# Patient Record
Sex: FEMALE | Race: WHITE | NOT HISPANIC OR LATINO | Employment: FULL TIME | URBAN - NONMETROPOLITAN AREA
[De-identification: names, ages, dates, MRNs, and addresses within clinical notes are randomized per-mention and may not be internally consistent; named-entity substitution may affect disease eponyms.]

---

## 2018-08-29 ENCOUNTER — OFFICE VISIT (OUTPATIENT)
Dept: NEUROLOGY | Facility: CLINIC | Age: 33
End: 2018-08-29

## 2018-08-29 VITALS
WEIGHT: 179.8 LBS | OXYGEN SATURATION: 98 % | DIASTOLIC BLOOD PRESSURE: 84 MMHG | BODY MASS INDEX: 29.96 KG/M2 | HEART RATE: 73 BPM | HEIGHT: 65 IN | SYSTOLIC BLOOD PRESSURE: 130 MMHG

## 2018-08-29 DIAGNOSIS — G43.119 INTRACTABLE MIGRAINE WITH AURA WITHOUT STATUS MIGRAINOSUS: Primary | ICD-10-CM

## 2018-08-29 PROCEDURE — 99203 OFFICE O/P NEW LOW 30 MIN: CPT | Performed by: NURSE PRACTITIONER

## 2018-08-29 RX ORDER — TOPIRAMATE 25 MG/1
25 TABLET ORAL SEE ADMIN INSTRUCTIONS
Qty: 120 TABLET | Refills: 3 | Status: SHIPPED | OUTPATIENT
Start: 2018-08-29 | End: 2018-09-28

## 2018-08-29 RX ORDER — LANOLIN ALCOHOL/MO/W.PET/CERES
325 CREAM (GRAM) TOPICAL 2 TIMES DAILY
Qty: 60 TABLET | Refills: 3 | Status: SHIPPED | OUTPATIENT
Start: 2018-08-29 | End: 2019-04-28

## 2018-08-29 RX ORDER — TOPIRAMATE 50 MG/1
50 TABLET, FILM COATED ORAL 2 TIMES DAILY
Qty: 60 TABLET | Refills: 2 | Status: SHIPPED | OUTPATIENT
Start: 2018-08-29 | End: 2018-08-29

## 2018-10-02 ENCOUNTER — PROCEDURE VISIT (OUTPATIENT)
Dept: NEUROLOGY | Facility: CLINIC | Age: 33
End: 2018-10-02

## 2018-10-02 VITALS
WEIGHT: 179 LBS | HEIGHT: 65 IN | OXYGEN SATURATION: 98 % | BODY MASS INDEX: 29.82 KG/M2 | HEART RATE: 84 BPM | SYSTOLIC BLOOD PRESSURE: 132 MMHG | DIASTOLIC BLOOD PRESSURE: 80 MMHG

## 2018-10-02 DIAGNOSIS — G43.119 INTRACTABLE MIGRAINE WITH AURA WITHOUT STATUS MIGRAINOSUS: Primary | ICD-10-CM

## 2018-10-02 PROCEDURE — 64615 CHEMODENERV MUSC MIGRAINE: CPT | Performed by: NURSE PRACTITIONER

## 2018-10-02 NOTE — PROGRESS NOTES
Patient has over 30 migraines a month over 6 hours duration interfering with the patient daily activities despite conservative medical treatment for acute and preventive measures.    The risk and benefit of the Botox treatment has been discussed with patient.  Safety information and contraindication of Botox has been reviewed with patient.  The most frequent Adverse reactions of Botox for migraine include but not limited to neck pain 9%, headache 5%, Ptosis 4%, muscle weakness 4%, injection site pain 3%, muscle spasms 2% have been gone over with our patient.  200 unit vial Botox was re-constituted with 4 mL 0.9 NS to 5 unit/0.1 mL using standard techniques.  10 units were given at the  muscle in 2 divided sites  5 units were given at the Procerus muscle  20 units were given at the Frontalis muscle in 4 divided sites  40 units were given at the Temporalis muscle in 8 divided sites  30 units were given at the Occipitalis muscle in 6 divided sites  20 units were given at the cervical paraspinal muscle in 4 divided sites  30 units were given at the Trapezius muscle in 6 divided sites  For a total of 155 units divided between 31 sites and 45 units of wastage  Patient tolerated procedure well without complication.

## 2018-11-20 ENCOUNTER — OFFICE VISIT (OUTPATIENT)
Dept: NEUROLOGY | Facility: CLINIC | Age: 33
End: 2018-11-20

## 2018-11-20 VITALS
DIASTOLIC BLOOD PRESSURE: 78 MMHG | SYSTOLIC BLOOD PRESSURE: 128 MMHG | HEIGHT: 65 IN | BODY MASS INDEX: 29.79 KG/M2 | OXYGEN SATURATION: 98 % | HEART RATE: 84 BPM

## 2018-11-20 DIAGNOSIS — G43.119 INTRACTABLE MIGRAINE WITH AURA WITHOUT STATUS MIGRAINOSUS: Primary | ICD-10-CM

## 2018-11-20 PROCEDURE — 99212 OFFICE O/P EST SF 10 MIN: CPT | Performed by: NURSE PRACTITIONER

## 2019-01-17 ENCOUNTER — PROCEDURE VISIT (OUTPATIENT)
Dept: NEUROLOGY | Facility: CLINIC | Age: 34
End: 2019-01-17

## 2019-01-17 VITALS — WEIGHT: 179 LBS | HEIGHT: 65 IN | HEART RATE: 67 BPM | OXYGEN SATURATION: 98 % | BODY MASS INDEX: 29.82 KG/M2

## 2019-01-17 DIAGNOSIS — G43.119 INTRACTABLE MIGRAINE WITH AURA WITHOUT STATUS MIGRAINOSUS: Primary | ICD-10-CM

## 2019-01-17 PROCEDURE — 64615 CHEMODENERV MUSC MIGRAINE: CPT | Performed by: NURSE PRACTITIONER

## 2019-01-17 NOTE — PROGRESS NOTES
Patient had over 28 migraines a month over 8 hours duration interfering with the patient daily activities despite conservative medical treatment for acute and preventive measures.  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  Patient states the migraines continue to come behind her eyes and into both temple areas they do occur.  The risk and benefit of the Botox treatment has been discussed with patient.  Safety information and contraindication of Botox has been reviewed with patient.  The most frequent Adverse reactions of Botox for migraine include but not limited to neck pain 9%, headache 5%, Ptosis 4%, muscle weakness 4%, injection site pain 3%, muscle spasms 2% have been gone over with our patient.  200 unit vial Botox was re-constituted with 4 mL 0.9 NS to 5 unit/0.1 mL using standard techniques.  10 units were given at the  muscle in 2 divided sites  5 units were given at the Procerus muscle  20 units were given at the Frontalis muscle in 4 divided sites  40 units were given at the Temporalis muscle in 8 divided sites  30 units were given at the Occipitalis muscle in 6 divided sites  20 units were given at the cervical paraspinal muscle in 4 divided sites  30 units were given at the Trapezius muscle in 6 divided sites    Follow the pain: patient continues with the above-noted migraines each month.  After further conversations we will add   20 units were given at the Orbicular oculi in 4 divided sites  5 Additional units were given in the procerus muscle  10 additional units were given in the frontalis muscle In 2 divided sites  10 additional units were given in the Temporalis muscle in 2 divided sites    Patient received a total of 200 units of Botox today in clinic to attempt to resolve the remaining for migraines.  We'll  have patient return to clinic in 3 months.      Patient tolerated procedure well without complication.

## 2019-04-28 ENCOUNTER — OFFICE VISIT (OUTPATIENT)
Dept: RETAIL CLINIC | Facility: CLINIC | Age: 34
End: 2019-04-28

## 2019-04-28 VITALS
OXYGEN SATURATION: 96 % | BODY MASS INDEX: 29.46 KG/M2 | RESPIRATION RATE: 20 BRPM | WEIGHT: 176.8 LBS | HEIGHT: 65 IN | TEMPERATURE: 99.1 F | HEART RATE: 95 BPM

## 2019-04-28 DIAGNOSIS — J03.90 TONSILLITIS: Primary | ICD-10-CM

## 2019-04-28 DIAGNOSIS — Z20.818 STREP THROAT EXPOSURE: ICD-10-CM

## 2019-04-28 LAB
EXPIRATION DATE: NORMAL
INTERNAL CONTROL: NORMAL
Lab: NORMAL
S PYO AG THROAT QL: NEGATIVE

## 2019-04-28 PROCEDURE — 87880 STREP A ASSAY W/OPTIC: CPT | Performed by: NURSE PRACTITIONER

## 2019-04-28 PROCEDURE — 99213 OFFICE O/P EST LOW 20 MIN: CPT | Performed by: NURSE PRACTITIONER

## 2019-04-28 RX ORDER — AMOXICILLIN AND CLAVULANATE POTASSIUM 875; 125 MG/1; MG/1
1 TABLET, FILM COATED ORAL 2 TIMES DAILY
Qty: 20 TABLET | Refills: 0 | Status: SHIPPED | OUTPATIENT
Start: 2019-04-28 | End: 2019-05-08

## 2019-04-28 RX ORDER — METHYLPREDNISOLONE 4 MG/1
TABLET ORAL
Qty: 21 TABLET | Refills: 0 | Status: SHIPPED | OUTPATIENT
Start: 2019-04-28 | End: 2019-05-23

## 2019-04-28 NOTE — PROGRESS NOTES
Subjective   No chief complaint on file.      Hermila Sierra is a 34 y.o. female.     Daughter recently had strep throat      Sore Throat    This is a new problem. The current episode started yesterday. The problem has been gradually worsening. Neither side of throat is experiencing more pain than the other. Maximum temperature: low grade. Associated symptoms include headaches. Pertinent negatives include no abdominal pain, congestion, coughing, diarrhea, ear pain, hoarse voice, shortness of breath, trouble swallowing or vomiting. She has had exposure to strep. Treatments tried: tylenol. The treatment provided no relief.        Allergies   Allergen Reactions   • Sulfa Antibiotics Unknown (See Comments)             Past Medical History:   Diagnosis Date   • Migraine        Past Surgical History:   Procedure Laterality Date   • TUBAL ABDOMINAL LIGATION     • WISDOM TOOTH EXTRACTION         Social History     Socioeconomic History   • Marital status:      Spouse name: Not on file   • Number of children: Not on file   • Years of education: Not on file   • Highest education level: Not on file   Tobacco Use   • Smoking status: Former Smoker     Years: 4.00     Last attempt to quit:      Years since quittin.3   • Smokeless tobacco: Never Used   Substance and Sexual Activity   • Alcohol use: No   • Drug use: No   • Sexual activity: Defer       Family History   Problem Relation Age of Onset   • Hypertension Father    • Heart disease Father    • Diabetes Father    • Diabetes Brother    • Diabetes Mother          Current Outpatient Medications:   •  amoxicillin-clavulanate (AUGMENTIN) 875-125 MG per tablet, Take 1 tablet by mouth 2 (Two) Times a Day for 10 days., Disp: 20 tablet, Rfl: 0  •  methylPREDNISolone (MEDROL, [place],) 4 MG tablet, Take as directed on package instructions., Disp: 21 tablet, Rfl: 0      Review of Systems   Constitutional: Negative for chills, diaphoresis, fatigue and fever.   HENT:  "Positive for sore throat. Negative for congestion, ear pain, hoarse voice, rhinorrhea, sinus pressure, sneezing and trouble swallowing.    Respiratory: Negative for cough and shortness of breath.    Gastrointestinal: Negative for abdominal pain, diarrhea and vomiting.   Musculoskeletal: Negative for myalgias.   Neurological: Positive for headache.        Vitals:    04/28/19 1459   Pulse: 95   Resp: 20   Temp: 99.1 °F (37.3 °C)   SpO2: 96%   Weight: 80.2 kg (176 lb 12.8 oz)   Height: 165.1 cm (65\")       Objective   Physical Exam   Constitutional: She is oriented to person, place, and time. She appears well-developed and well-nourished.   HENT:   Head: Normocephalic.   Right Ear: Tympanic membrane, external ear and ear canal normal.   Left Ear: Tympanic membrane, external ear and ear canal normal.   Nose: Nose normal.   Mouth/Throat: Mucous membranes are normal. Posterior oropharyngeal erythema present. Tonsils are 3+ on the right. Tonsils are 3+ on the left. Tonsillar exudate.   Eyes: Conjunctivae are normal.   Cardiovascular: Normal rate, regular rhythm, S1 normal, S2 normal and normal heart sounds.   Pulmonary/Chest: Effort normal and breath sounds normal.   Abdominal: Soft. Normal appearance. There is no tenderness.   Lymphadenopathy:     She has no cervical adenopathy.   Neurological: She is alert and oriented to person, place, and time.        Procedures     Assessment/Plan   Diagnoses and all orders for this visit:    Tonsillitis  -     amoxicillin-clavulanate (AUGMENTIN) 875-125 MG per tablet; Take 1 tablet by mouth 2 (Two) Times a Day for 10 days.  -     methylPREDNISolone (MEDROL, [place],) 4 MG tablet; Take as directed on package instructions.    Strep throat exposure  -     amoxicillin-clavulanate (AUGMENTIN) 875-125 MG per tablet; Take 1 tablet by mouth 2 (Two) Times a Day for 10 days.  -     POCT rapid strep A        Results for orders placed or performed in visit on 04/28/19   POCT rapid strep A   Result " Value Ref Range    Rapid Strep A Screen Negative Negative, VALID, INVALID, Not Performed    Internal Control Passed Passed    Lot Number [id number]     Expiration Date 10/20        PLAN: Discussed dosing, side effects, recommended other symptomatic care.  Patient should follow up with primary care provider if symptoms worsen, fail to resolve or other symptoms need attention. Patient/family agree to the above. Treatment based on physical exam and exposure. Advised to alternate tylenol and motrin for pain and/or fever, change toothbrush, stay hydrated and rest.       An After Visit Summary was printed and given to the patient.      JONELLE Tong

## 2019-05-14 PROBLEM — R51.9 NONINTRACTABLE EPISODIC HEADACHE: Status: ACTIVE | Noted: 2019-05-14

## 2019-05-23 ENCOUNTER — PROCEDURE VISIT (OUTPATIENT)
Dept: NEUROLOGY | Facility: CLINIC | Age: 34
End: 2019-05-23

## 2019-05-23 VITALS
HEIGHT: 65 IN | WEIGHT: 176 LBS | DIASTOLIC BLOOD PRESSURE: 60 MMHG | SYSTOLIC BLOOD PRESSURE: 104 MMHG | BODY MASS INDEX: 29.32 KG/M2 | HEART RATE: 62 BPM | OXYGEN SATURATION: 98 %

## 2019-05-23 DIAGNOSIS — G43.119 INTRACTABLE MIGRAINE WITH AURA WITHOUT STATUS MIGRAINOSUS: Primary | ICD-10-CM

## 2019-05-23 PROCEDURE — 64615 CHEMODENERV MUSC MIGRAINE: CPT | Performed by: NURSE PRACTITIONER

## 2019-05-23 NOTE — PROGRESS NOTES
.  SUBJECTIVE:  Hermila Sierra is a 34 y.o. female     Chief Complaint   Patient presents with   • Procedure     Pt sup. Botox  NDC:4475-6885-13       Hermila Sierra in clinic for Botox for migraines. Patient had 25 number of migraines monthly before Botox. Since  Botox patient has had a > 50% reduction of migraines.  Patient states she has had 2 severe migraines in the last 3 months with approximately 10 migraines total in the last 3 months Hermila Sierra  is pleased with current treatment.   The risk and benefit of the Botox treatment has been discussed with patient.  Safety information and contraindication of Botox has been reviewed with patient.  The most frequent Adverse reactions of Botox for migraine include but not limited to neck pain 9%, headache 5%, Ptosis 4%, muscle weakness 4%, injection site pain 3%, muscle spasms 2% have been gone over with our patient.    History of Present Illness    The following portions of the patient's history were reviewed and updated as appropriate: allergies, current medications, past family history, past medical history, past social history, past surgical history and problem list.    Review of Systems   Constitutional: Negative for chills and fever.   HENT: Negative for congestion, ear pain, hearing loss, rhinorrhea and sore throat.    Eyes: Negative for pain, discharge and redness.   Respiratory: Negative for cough, shortness of breath, wheezing and stridor.    Cardiovascular: Negative for chest pain, palpitations and leg swelling.   Gastrointestinal: Negative for abdominal pain, constipation, nausea and vomiting.   Endocrine: Negative for cold intolerance, heat intolerance and polyphagia.   Genitourinary: Negative for dysuria, flank pain, frequency and urgency.   Musculoskeletal: Negative for joint swelling, myalgias, neck pain and neck stiffness.   Skin: Negative for pallor, rash and wound.   Allergic/Immunologic: Negative for environmental allergies.   Neurological: Positive  "for headaches. Negative for dizziness, tremors, seizures, syncope, facial asymmetry, speech difficulty, weakness, light-headedness and numbness.   Hematological: Negative for adenopathy.   Psychiatric/Behavioral: Negative for confusion and hallucinations. The patient is not nervous/anxious.        OBJECTIVE:    Vitals:    05/23/19 1328   BP: 104/60   Pulse: 62   SpO2: 98%   Weight: 79.8 kg (176 lb)   Height: 165.1 cm (65\")     GENERAL: Patient is pleasant, cooperative, appears to be stated age.  Body habitus is endomorphic.  HEAD:  Head is normocephalic and atraumatic.    PSYCH:  Higher cortical function/mental status:  The patient is alert.  She  is oriented x3 to time, place and person.  Recent and the remote memory appear normal.  The patient has a good fund of knowledge.  There is no visual or auditory hallucination or suicidal or homicidal ideation.  SPEECH:There is no gross evidence of aphasia, dysarthria or agnosia.      CRANIAL NERVES: Extraocular movements are full and smooth with normal pursuits and saccades.  No nystagmus noted.  The face is symmetric. Tongue midline.   COORDINATION AND GAIT:  The patient walks with a narrow-based gait.        BOTOX PROCEDURE:   The risk and benefit of the Botox treatment has been discussed with patient.  Safety information and contraindication of Botox has been reviewed with patient.  The most frequent Adverse reactions of Botox for migraine include but not limited to neck pain 9%, headache 5%, Ptosis 4%, muscle weakness 4%, injection site pain 3%, muscle spasms 2% have been gone over with our patient.  200 unit vial Botox was re-constituted with 4 mL 0.9 NS to 5 unit/0.1 mL using standard techniques.  10 units were given at the  muscle in 2 divided sites  5 units were given at the Procerus muscle  20 units were given at the Frontalis muscle in 4 divided sites  40 units were given at the Temporalis muscle in 8 divided sites  30 units were given at the " Occipitalis muscle in 6 divided sites  20 units were given at the cervical paraspinal muscle in 4 divided sites  30 units were given at the Trapezius muscle in 6 divided sites  Follow the pain: Additional units  30 units (15 units bilat) were given at the Orbicularis oculi  5 units additional Frontalis  10 units additional at the Temporalis muscle  For a total of 200 units 0 units wastage.  Patient tolerated the procedure well  Patient tolerated procedure well without complication.      ASSESSMENT/PLAN:    It has been determined that Ms. Sierra has chronic migraine headaches. We will proceed with a 12 week regimen of 200 units of Botox injections, to treat the patient's chronic migraines.

## 2019-09-12 ENCOUNTER — PROCEDURE VISIT (OUTPATIENT)
Dept: NEUROLOGY | Facility: CLINIC | Age: 34
End: 2019-09-12

## 2019-09-12 VITALS
TEMPERATURE: 99 F | HEART RATE: 86 BPM | BODY MASS INDEX: 27.29 KG/M2 | SYSTOLIC BLOOD PRESSURE: 110 MMHG | OXYGEN SATURATION: 98 % | DIASTOLIC BLOOD PRESSURE: 78 MMHG | WEIGHT: 164 LBS

## 2019-09-12 DIAGNOSIS — G43.119 INTRACTABLE MIGRAINE WITH AURA WITHOUT STATUS MIGRAINOSUS: Primary | ICD-10-CM

## 2019-09-12 PROCEDURE — 64615 CHEMODENERV MUSC MIGRAINE: CPT | Performed by: NURSE PRACTITIONER

## 2019-10-17 ENCOUNTER — HOSPITAL ENCOUNTER (OUTPATIENT)
Dept: MRI IMAGING | Facility: HOSPITAL | Age: 34
Discharge: HOME | End: 2019-10-17
Admitting: NURSE PRACTITIONER

## 2019-10-17 DIAGNOSIS — G43.119 INTRACTABLE MIGRAINE WITH AURA WITHOUT STATUS MIGRAINOSUS: ICD-10-CM

## 2019-10-17 PROCEDURE — 70551 MRI BRAIN STEM W/O DYE: CPT

## 2019-10-24 ENCOUNTER — OFFICE VISIT (OUTPATIENT)
Dept: NEUROLOGY | Facility: CLINIC | Age: 34
End: 2019-10-24

## 2019-10-24 VITALS
HEART RATE: 66 BPM | SYSTOLIC BLOOD PRESSURE: 108 MMHG | OXYGEN SATURATION: 99 % | TEMPERATURE: 98.6 F | HEIGHT: 65 IN | WEIGHT: 171 LBS | RESPIRATION RATE: 17 BRPM | DIASTOLIC BLOOD PRESSURE: 70 MMHG | BODY MASS INDEX: 28.49 KG/M2

## 2019-10-24 DIAGNOSIS — G43.119 INTRACTABLE MIGRAINE WITH AURA WITHOUT STATUS MIGRAINOSUS: Primary | ICD-10-CM

## 2019-10-24 DIAGNOSIS — G44.219 EPISODIC TENSION-TYPE HEADACHE, NOT INTRACTABLE: ICD-10-CM

## 2019-10-24 PROCEDURE — 99213 OFFICE O/P EST LOW 20 MIN: CPT | Performed by: NURSE PRACTITIONER

## 2019-10-24 RX ORDER — TOPIRAMATE 100 MG/1
100 CAPSULE, EXTENDED RELEASE ORAL DAILY
Qty: 30 CAPSULE | Refills: 3 | Status: SHIPPED | OUTPATIENT
Start: 2019-10-24 | End: 2020-05-08

## 2020-01-08 ENCOUNTER — PROCEDURE VISIT (OUTPATIENT)
Dept: NEUROLOGY | Facility: CLINIC | Age: 35
End: 2020-01-08

## 2020-01-08 VITALS
TEMPERATURE: 97.9 F | OXYGEN SATURATION: 98 % | BODY MASS INDEX: 28.49 KG/M2 | HEIGHT: 65 IN | WEIGHT: 171 LBS | HEART RATE: 89 BPM

## 2020-01-08 DIAGNOSIS — G43.119 INTRACTABLE MIGRAINE WITH AURA WITHOUT STATUS MIGRAINOSUS: Primary | ICD-10-CM

## 2020-01-08 PROCEDURE — 64615 CHEMODENERV MUSC MIGRAINE: CPT | Performed by: NURSE PRACTITIONER

## 2020-01-08 NOTE — PROGRESS NOTES
.  SUBJECTIVE:  Hermila Sierar is a 34 y.o. female     Chief Complaint   Patient presents with   • Botulinum Toxin Injection       Hermila Sierra in clinic for Botox for migraines. Patient had 25 number of migraines monthly before Botox. Since  Botox patient has had a > 50% reduction of migraines. Hermila Sierra  is pleased with current treatment.   The risk and benefit of the Botox treatment has been discussed with patient.  Safety information and contraindication of Botox has been reviewed with patient.  The most frequent Adverse reactions of Botox for migraine include but not limited to neck pain 9%, headache 5%, Ptosis 4%, muscle weakness 4%, injection site pain 3%, muscle spasms 2% have been gone over with our patient.    History of Present Illness    The following portions of the patient's history were reviewed and updated as appropriate: allergies, current medications, past family history, past medical history, past social history, past surgical history and problem list.    Review of Systems   Constitutional: Negative for chills and fever.   HENT: Negative for congestion, ear pain, hearing loss, rhinorrhea and sore throat.    Eyes: Negative for pain, discharge and redness.   Respiratory: Negative for cough, shortness of breath, wheezing and stridor.    Cardiovascular: Negative for chest pain, palpitations and leg swelling.   Gastrointestinal: Negative for abdominal pain, constipation, nausea and vomiting.   Endocrine: Negative for cold intolerance, heat intolerance and polyphagia.   Genitourinary: Negative for dysuria, flank pain, frequency and urgency.   Musculoskeletal: Negative for joint swelling, myalgias, neck pain and neck stiffness.   Skin: Negative for pallor, rash and wound.   Allergic/Immunologic: Negative for environmental allergies.   Neurological: Positive for headaches. Negative for dizziness, tremors, seizures, syncope, facial asymmetry, speech difficulty, weakness, light-headedness and numbness.  "  Hematological: Negative for adenopathy.   Psychiatric/Behavioral: Negative for confusion and hallucinations. The patient is not nervous/anxious.        OBJECTIVE:    Vitals:    01/08/20 0915   Pulse: 89   Temp: 97.9 °F (36.6 °C)   SpO2: 98%   Weight: 77.6 kg (171 lb)   Height: 165.1 cm (65\")     GENERAL: Patient is pleasant, cooperative, appears to be stated age.  Body habitus is endomorphic.  HEAD:  Head is normocephalic and atraumatic.    PSYCH:  Higher cortical function/mental status:  The patient is alert.  She  is oriented x3 to time, place and person.  Recent and the remote memory appear normal.  The patient has a good fund of knowledge.  There is no visual or auditory hallucination or suicidal or homicidal ideation.  SPEECH:There is no gross evidence of aphasia, dysarthria or agnosia.      CRANIAL NERVES: Extraocular movements are full and smooth with normal pursuits and saccades.  No nystagmus noted.  The face is symmetric. Tongue midline.   COORDINATION AND GAIT:  The patient walks with a narrow-based gait.        BOTOX PROCEDURE:   The risk and benefit of the Botox treatment has been discussed with patient.  Safety information and contraindication of Botox has been reviewed with patient.  The most frequent Adverse reactions of Botox for migraine include but not limited to neck pain 9%, headache 5%, Ptosis 4%, muscle weakness 4%, injection site pain 3%, muscle spasms 2% have been gone over with our patient.  200 unit vial Botox was re-constituted with 4 mL 0.9 NS to 5 unit/0.1 mL using standard techniques.  10 units were given at the  muscle in 2 divided sites  5 units were given at the Procerus muscle  20 units were given at the Frontalis muscle in 4 divided sites  40 units were given at the Temporalis muscle in 8 divided sites  30 units were given at the Occipitalis muscle in 6 divided sites  20 units were given at the cervical paraspinal muscle in 4 divided sites  30 units were given at the " Trapezius muscle in 6 divided sites  Follow the pain: Additional units  30 units (15 units bilat) were given at the Orbicularis oculi  5 units additional Frontalis  10 units additional at the Temporalis muscle  For a total of 200 units 0 units wastage.  Patient tolerated the procedure well  Patient tolerated procedure well without complication.      ASSESSMENT/PLAN:    It has been determined that Ms. Sierra has chronic migraine headaches. We will proceed with a 12 week regimen of 200 units of Botox injections, to treat the patient's chronic migraines.

## 2020-03-24 ENCOUNTER — TELEPHONE (OUTPATIENT)
Dept: NEUROLOGY | Facility: CLINIC | Age: 35
End: 2020-03-24

## 2020-05-06 ENCOUNTER — TELEPHONE (OUTPATIENT)
Dept: NEUROLOGY | Facility: CLINIC | Age: 35
End: 2020-05-06

## 2020-05-06 NOTE — TELEPHONE ENCOUNTER
Pt is calling to schedule Botox Appt.   She is a previous valerie jordan appt.       Please call [phone]

## 2020-05-07 NOTE — TELEPHONE ENCOUNTER
Okay thank you. I printed the information I need other than the note and I will get started on this on Monday.

## 2020-05-08 ENCOUNTER — TELEMEDICINE (OUTPATIENT)
Dept: NEUROLOGY | Facility: CLINIC | Age: 35
End: 2020-05-08

## 2020-05-08 DIAGNOSIS — G43.009 MIGRAINE WITHOUT AURA AND WITHOUT STATUS MIGRAINOSUS, NOT INTRACTABLE: Primary | ICD-10-CM

## 2020-05-08 PROCEDURE — 99215 OFFICE O/P EST HI 40 MIN: CPT | Performed by: PSYCHIATRY & NEUROLOGY

## 2020-05-08 NOTE — PROGRESS NOTES
Subjective:    CC: Hermila Sierra is seen today via video visit in consultation at the request of her PCP for headaches    HPI:  35-year-old female with no significant past medical history presents with headaches.  As per patient she has had headaches since she was a teenager however they worsened while she was pregnant with her third child who is now 10.  In November 2017 patient had a severe thunderclap headache in the back of her head.  She had a CT head at the time with did not show any hemorrhage.  After that she started to have frequent headaches and was tried on several different medications including propranolol that made her blood pressure drop,  amitriptyline that made her dull, Topamax that caused dizziness, Trokendi that was not approved by insurance and Imitrex that made her very sleepy and caused burning in the back of her head.  In October 2018 she started to get Botox from [facility].  That helped with her headaches tremendously and she now has 1 migraine headache every few months for which she takes over-the-counter medications that may or may not help.  However she continues to have a few dull headaches each month around her menstrual cycle.  These started after she went off birth control.  Has had a dull headache for the past 3 days now which was not relieved by taking Tylenol or ibuprofen.  She also had a MRI brain in October 2019 that did not show any acute intracranial abnormalities or chronic ischemic changes.    The following portions of the patient's history were reviewed today and updated as of 05/08/2020  : allergies, current medications, past family history, past medical history, past social history, past surgical history and problem list  These document will be scanned to patient's chart.      Current Outpatient Medications:   •  Ubrogepant (ubrogepant) 100 MG tablet, Take 1 tablet by mouth As Needed (for headache). May repeat once in 2 hours.  Do not take over 2 tabs a day or 8 tabs a  month, Disp: 8 tablet, Rfl: 3   Past Medical History:   Diagnosis Date   • Migraine       Past Surgical History:   Procedure Laterality Date   • TUBAL ABDOMINAL LIGATION  2010   • WISDOM TOOTH EXTRACTION        Family History   Problem Relation Age of Onset   • Hypertension Father    • Heart disease Father    • Diabetes Father    • Diabetes Brother    • Diabetes Mother       Social History     Socioeconomic History   • Marital status:      Spouse name: Not on file   • Number of children: Not on file   • Years of education: Not on file   • Highest education level: Not on file   Tobacco Use   • Smoking status: Former Smoker     Years: 4.00     Last attempt to quit: 2010     Years since quitting: 10.3   • Smokeless tobacco: Never Used   Substance and Sexual Activity   • Alcohol use: No   • Drug use: No   • Sexual activity: Defer     Review of Systems    Objective:    There were no vitals taken for this visit.    Neurology Exam:    General apperance: NAD.     Mental status: Alert, awake and oriented to time place and person.    Recent and Remote memory: Intact.    Attention span and Concentration: Normal.     Language and Speech: Intact- No dysarthria.    Fluency, Naming , Repitition and Comprehension:  Intact    Cranial Nerves:   CN II: Visual fields are full. Intact. Fundi - Normal, No papillederma, Pupils - TOM  CN III, IV and VI: Extraocular movements are intact. Normal saccades.   CN V: Facial sensation is intact.   CN VII: Muscles of facial expression reveal no asymmetry. Intact.   CN VIII: Hearing is intact. Whispered voice intact.   CN IX and X: Palate elevates symmetrically. Intact  CN XI: Shoulder shrug is intact.   CN XII: Tongue is midline without evidence of atrophy or fasciculation.       Motor:  Normal    Gait: Normal.      Assessment and Plan:  1. Migraine without aura and without status migrainosus, not intractable  Patient has a combination of migraine and hormonal headaches  -I will restart  Botox.  Can schedule her for that next week as her last injection was in January.  -For abortive treatment of her headaches I will prescribe her Ubrelvy 100 mg as needed  -For her hormonal headaches I have told her to speak to her OB/GYN about starting a low-dose oral contraceptive pill       Follow-up in 1 week for Botox    I spent over 40 minutes with the patient face to face out of which over 50% (25 minutes) was spent in management, instructions and education regarding her headaches    Sravani Mccauley MD

## 2020-05-11 ENCOUNTER — TELEPHONE (OUTPATIENT)
Dept: NEUROLOGY | Facility: CLINIC | Age: 35
End: 2020-05-11

## 2020-05-15 ENCOUNTER — TELEPHONE (OUTPATIENT)
Dept: NEUROLOGY | Facility: CLINIC | Age: 35
End: 2020-05-15

## 2020-05-15 NOTE — TELEPHONE ENCOUNTER
PT CALLED AND STATED THAT PHARMACY HAS SENT INFORMATION RE: PRIOR AUTH NEEDED FOR UBRELVY AND IS WAITING FOR A RESPONSE.  SHE IS CALLING TO FOLLOW UP ON THIS PRIOR AUTH TO GET THAT FILLED.    CALL BACK TO UPDATE ON STATUS: [phone], AFTER 2:30 PM.  PLEASE LEAVE VM IF NO ANSWER.

## 2020-06-04 ENCOUNTER — TELEPHONE (OUTPATIENT)
Dept: NEUROLOGY | Facility: CLINIC | Age: 35
End: 2020-06-04

## 2020-06-04 NOTE — TELEPHONE ENCOUNTER
Dorinda Sheehan called to verify and confirm office address for pt Botox shipping due to arrive [employer] 6/9/20. Thanks.

## 2020-06-18 ENCOUNTER — PROCEDURE VISIT (OUTPATIENT)
Dept: OBSTETRICS AND GYNECOLOGY | Facility: CLINIC | Age: 35
End: 2020-06-18

## 2020-06-18 VITALS
BODY MASS INDEX: 29.16 KG/M2 | DIASTOLIC BLOOD PRESSURE: 70 MMHG | SYSTOLIC BLOOD PRESSURE: 110 MMHG | HEIGHT: 65 IN | WEIGHT: 175 LBS

## 2020-06-18 DIAGNOSIS — G43.829 MENSTRUAL MIGRAINE WITHOUT STATUS MIGRAINOSUS, NOT INTRACTABLE: ICD-10-CM

## 2020-06-18 DIAGNOSIS — Z01.419 ENCOUNTER FOR GYNECOLOGICAL EXAMINATION (GENERAL) (ROUTINE) WITHOUT ABNORMAL FINDINGS: Primary | ICD-10-CM

## 2020-06-18 DIAGNOSIS — Z12.39 ENCOUNTER FOR BREAST CANCER SCREENING OTHER THAN MAMMOGRAM: ICD-10-CM

## 2020-06-18 DIAGNOSIS — N92.0 MENORRHAGIA WITH REGULAR CYCLE: ICD-10-CM

## 2020-06-18 PROCEDURE — 99202 OFFICE O/P NEW SF 15 MIN: CPT | Performed by: OBSTETRICS & GYNECOLOGY

## 2020-06-18 PROCEDURE — 99385 PREV VISIT NEW AGE 18-39: CPT | Performed by: OBSTETRICS & GYNECOLOGY

## 2020-06-18 RX ORDER — LEVONORGESTREL AND ETHINYL ESTRADIOL 100-20(84)
1 KIT ORAL DAILY
Qty: 91 TABLET | Refills: 4 | Status: SHIPPED | OUTPATIENT
Start: 2020-06-18 | End: 2020-08-09

## 2020-06-18 NOTE — PROGRESS NOTES
Subjective  Chief Complaint   Patient presents with   • Gynecologic Exam     Patient is 35 y.o.  here as a new patient for her annual examination.  Patient is also here to discuss the possibility of Seasonale.  Patient has had a previous tubal ligation.  Patient has a history of migraines.  Patient has been seen by neurologist in [place].  Patient has been receiving Botox injections.  She reports 99% improvement in her migraines.  Patient reports however she continues to have migraines with her menses.  Patient reports her neurologist is recommended Seasonale.  Patient also reports having heavy menses.  Patient reports they are irregular in nature.  She reports they are lasting for 6 days.  Patient reports they are extremely heavy changing a maxi pad and tampon every 1 hour.  Patient also with a family history of breast cancer.  Patient has 8 genetic counseling and screening.  Patient reports her screening returned as negative.  Patient reports however based upon family history she is at high risk for breast cancer.  Patient has been having mammograms every 6 months.  Patient will also start having breast MRI.  Patient sees Dr. Pinzon for her primary care.  Patient has not had a Pap smear since 2018.  Patient does have a history of previous abnormal Pap smears in the past.    History  Past Medical History:   Diagnosis Date   • Abnormal Pap smear of cervix    • Migraine      Current Outpatient Medications on File Prior to Visit   Medication Sig Dispense Refill   • OnabotulinumtoxinA 200 units reconstituted solution Inject 200 units IM into head neck shoulders every 12 weeks per FDA protocol. Dx:G43.709 1 each 3   • Ubrogepant (ubrogepant) 100 MG tablet Take 1 tablet by mouth As Needed (for headache). May repeat once in 2 hours.  Do not take over 2 tabs a day or 8 tabs a month 8 tablet 3     No current facility-administered medications on file prior to visit.      Allergies   Allergen Reactions   • Sulfa  "Antibiotics Unknown (See Comments)           Past Surgical History:   Procedure Laterality Date   • DILATATION AND CURETTAGE     • TUBAL ABDOMINAL LIGATION  2010   • WISDOM TOOTH EXTRACTION       Family History   Problem Relation Age of Onset   • Hypertension Father    • Heart disease Father    • Diabetes Father    • Diabetes Brother    • Diabetes Mother    • Breast cancer Paternal Grandmother    • Breast cancer Paternal Aunt      Social History     Socioeconomic History   • Marital status:      Spouse name: Not on file   • Number of children: Not on file   • Years of education: Not on file   • Highest education level: Not on file   Tobacco Use   • Smoking status: Former Smoker     Years: 4.00     Last attempt to quit: 2010     Years since quitting: 10.4   • Smokeless tobacco: Never Used   Substance and Sexual Activity   • Alcohol use: No   • Drug use: No   • Sexual activity: Yes     Partners: Male     Birth control/protection: Surgical     Review of Systems  All systems were reviewed and negative except for:  Hematologic / Lymphatic: positive for  anemia  Behavioral/Psych: positive for  unstable mood     Objective  Vitals:    06/18/20 1529   BP: 110/70   Weight: 79.4 kg (175 lb)   Height: 165.1 cm (65\")     Physical Exam:  General Appearance: alert, appears stated age and cooperative  Head: normocephalic, without obvious abnormality and atraumatic  Eyes: lids and lashes normal, conjunctivae and sclerae normal, no icterus, no pallor, corneas clear and PERRLA  Ears: ears appear intact with no abnormalities noted  Nose: nares normal, septum midline, mucosa normal and no drainage  Neck: suppple, trachea midline and no thyromegaly  Lungs: clear to auscultation, respirations regular, respirations even and respirations unlabored  Heart: regular rhythm and normal rate, normal S1, S2, no murmur, gallop, or rubs and no click  Breasts: Examined in supine position  Symmetric without masses or skin dimpling  Nipples " normal without inversion, lesions or discharge  There are no palpable axillary nodes  Abdomen: normal bowel sounds, no masses, no hepatomegaly, no splenomegaly, soft non-tender, no guarding and no rebound tenderness  Pelvic: Clinical staff was present for exam  External genitalia:  normal appearance of the external genitalia including Bartholin's and [place]'s glands.  :  urethral meatus normal;  Vaginal:  normal pink mucosa without prolapse or lesions.  Cervix:  normal appearance.  Uterus:  normal size, shape and consistency.  Adnexa:  normal bimanual exam of the adnexa.  Pap smear done and specimen sent using Thin-Prep technique  Extremities: moves extremities well, no edema, no cyanosis and no redness  Skin: no bleeding, bruising or rash and no lesions noted  Lymph Nodes: no palpable adenopathy  Neuro: CN II-X grossly intact; sensation intact  Psych: normal mood and affect, oriented to person, time and place, thought content organized and appropriate judgment  Lab Review   No data reviewed    Imaging   No data reviewed    Decision to Obtain Medical Records  No    Summary of Medical Records  No    Assessment/Plan  Problem List Items Addressed This Visit     None      Visit Diagnoses     Encounter for gynecological examination (general) (routine) without abnormal findings    -  Primary  Pap was done today.  If she does not receive the results of the Pap within 2 weeks  time, she was instructed to call to find out the results.  I explained to Hermila that the recommendations for Pap smear interval in a low risk patient has lengthened to 3 years time if cytology alone normal or  5 years time if both cytology and HPV testing were normal.  I encouraged her to be seen yearly for a full physical exam including breast and pelvic exam even during the off years when PAP's will not be performed.    Relevant Orders    Pap IG, Rfx HPV ASCU    Menstrual migraine without status migrainosus, not intractable      Patient with  menstrual migraines as noted.  I discussed the various options.  We will plan a trial with low-dose Seasonale as noted.  Patient is to start with her next menstrual cycle.  Instructions and precautions have been given.  Patient is to follow-up in 3 and half months.  Plan pending response to treatment.    Menorrhagia with regular cycle      Patient with menorrhagia as noted above.  We will plan a trial of low-dose Seasonale.  Instructions and precautions are given.  Patient is to follow-up in 3 and half months.  If no improvement in her symptoms then patient will need transvaginal ultrasound as well as laboratory assessment for further evaluation.    Encounter for breast cancer screening other than mammogram      It is recommended per ACOG, for women at average risk to start annual mammogram screening at the age of 40 until the age of 75 and an individualized decision be made for women after age 75.  She was encouraged to continue getting yearly mammograms.  She should report any palpable breast lump(s) or skin changes regardless of mammographic findings.  I explained to Hermila that notification regarding her mammogram results will come from the center performing the study.  Our office will not be routinely calling with mammogram results.  It is her responsibility to make sure that the results from the mammogram are communicated to her by the breast center.  If she has any questions about the results, she is welcome to call our office anytime.  Patient is to continue her mammograms alternating with breast MRI as noted.  Instructions precautions are given.  Patient is to sign to obtain copy of her records.    Hermila was counseled regarding having clinical breast exams and breast self-awareness.  Women aged 29-39 years of age should have clinical breast exams every 1-3 years and yearly aged 40 and older.  The patient was counseled regarding breast self-awareness focusing on having a sense of what is normal for her  breasts so that she can tell if there are changes.  Even small changes should be reported to provider.        Follow up as discussed/scheduled  Note: Speech recognition transcription software may have been used to dictate portions of this document.  An attempt at proofreading has been made though minor errors in transcription may still be present.  This note was electronically signed.  Randa Mcghee M.D.

## 2020-06-30 ENCOUNTER — OFFICE VISIT (OUTPATIENT)
Dept: ORTHOPEDIC SURGERY | Facility: CLINIC | Age: 35
End: 2020-06-30

## 2020-06-30 VITALS — HEIGHT: 65 IN | WEIGHT: 175 LBS | BODY MASS INDEX: 29.16 KG/M2 | RESPIRATION RATE: 18 BRPM

## 2020-06-30 DIAGNOSIS — S60.211A CONTUSION OF RIGHT WRIST, INITIAL ENCOUNTER: Primary | ICD-10-CM

## 2020-06-30 DIAGNOSIS — Z01.419 ENCOUNTER FOR GYNECOLOGICAL EXAMINATION (GENERAL) (ROUTINE) WITHOUT ABNORMAL FINDINGS: ICD-10-CM

## 2020-06-30 PROCEDURE — 99203 OFFICE O/P NEW LOW 30 MIN: CPT | Performed by: ORTHOPAEDIC SURGERY

## 2020-06-30 NOTE — PROGRESS NOTES
Subjective   Patient ID: Hermila Sierra is a 35 y.o. female  Pain of the Right Wrist (Patient is here today for right wrist pain, she states last night her ellis doodle tried to steal dinner, she reach for him but missed and slid in her socks onto the floor.)             History of Present Illness  35-year-old right-hand-dominant female fell directly onto her right outstretched hand backward as she was pulled by her dog she did hit her head did not lose consciousness complained initially of diffuse pain throughout the right wrist area was x-rayed in [facility] 3 views right wrist negative for acute fracture was told to follow-up here.  Complains of swelling and tenderness more in the volar aspect of her palm at the carpal tunnel region denies paresthesias in the fingers history of prior wrist injuries, does have pain with movement in the wrist at first she thought she hurt her elbow but today the elbow is not focally painful.      Review of Systems   Constitutional: Negative for fever.   HENT: Negative for dental problem and voice change.    Eyes: Negative for visual disturbance.   Respiratory: Negative for shortness of breath.    Cardiovascular: Negative for chest pain.   Gastrointestinal: Negative for abdominal pain.   Genitourinary: Negative for dysuria.   Musculoskeletal: Positive for arthralgias. Negative for gait problem and joint swelling.   Skin: Negative for rash.   Neurological: Negative for speech difficulty.   Hematological: Does not bruise/bleed easily.   Psychiatric/Behavioral: Negative for confusion.       Past Medical History:   Diagnosis Date   • Abnormal Pap smear of cervix    • Migraine         Past Surgical History:   Procedure Laterality Date   • DILATATION AND CURETTAGE     • TUBAL ABDOMINAL LIGATION  2010   • WISDOM TOOTH EXTRACTION         Family History   Problem Relation Age of Onset   • Hypertension Father    • Heart disease Father    • Diabetes Father    • Diabetes Brother  "   • Diabetes Mother    • Breast cancer Paternal Grandmother    • Breast cancer Paternal Aunt        Social History     Socioeconomic History   • Marital status:      Spouse name: Not on file   • Number of children: Not on file   • Years of education: Not on file   • Highest education level: Not on file   Tobacco Use   • Smoking status: Former Smoker     Years: 4.00     Last attempt to quit: 2010     Years since quitting: 10.5   • Smokeless tobacco: Never Used   Substance and Sexual Activity   • Alcohol use: No   • Drug use: No   • Sexual activity: Yes     Partners: Male     Birth control/protection: Surgical       I have reviewed the medical and surgical history, family history, social history, medications, and/or allergies, and the review of systems of this report.    Allergies   Allergen Reactions   • Sulfa Antibiotics Unknown (See Comments)               Current Outpatient Medications:   •  Levonorgest-Eth Estrad 91-Day 0.1-0.02 & 0.01 MG tablet, Take 1 tablet by mouth Daily., Disp: 91 tablet, Rfl: 4  •  OnabotulinumtoxinA 200 units reconstituted solution, Inject 200 units IM into head neck shoulders every 12 weeks per FDA protocol. Dx:G43.709, Disp: 1 each, Rfl: 3  •  Ubrogepant (ubrogepant) 100 MG tablet, Take 1 tablet by mouth As Needed (for headache). May repeat once in 2 hours.  Do not take over 2 tabs a day or 8 tabs a month, Disp: 8 tablet, Rfl: 3    Objective   Resp 18   Ht 165.1 cm (65\")   Wt 79.4 kg (175 lb)   BMI 29.12 kg/m²    Physical Exam  Constitutional: Patient is oriented to person, place, and time. Patient appears well-developed and well-nourished.   HENT:Head: Normocephalic and atraumatic.   Eyes: EOM are normal. Pupils are equal, round, and reactive to light.   Neck: Normal range of motion. Neck supple.   Cardiovascular: Normal rate.    Pulmonary/Chest: Effort normal and breath sounds normal.   Abdominal: Soft.   Neurological: Patient is alert and oriented to person, place, and " time.   Skin: Skin is warm and dry.   Psychiatric: Patient has a normal mood and affect.   Nursing note and vitals reviewed.       [unfilled]   Right elbow: No focal abrasions contusions ecchymosis or tenderness at the radiocapitellar or medial lateral regions.    Right forearm: Soft no abrasions contusions circulation appears normal    Right wrist: Some tenderness over the anatomic snuffbox area, pain is present mostly over the thenar and hyperthenar region over the hook of the hamate and triquetral regions but no ecchymosis is noted, sensation median nerve is intact no pain at the basal joint and no instability at the thumb IP or basal joint regions.  Minimal tenderness on the ulnar side of the wrist but no focal ecchymosis or swelling noted over the ulnar styloid.    Assessment/Plan   Review of Radiographic Studies:    No new imaging done today.      Procedures     Hermila was seen today for pain.    Diagnoses and all orders for this visit:    Contusion of right wrist, initial encounter       Use brace as instructed      Recommendations/Plan:   Work/Activity Status: No use of involved extremity    Patient agreeable to call or return sooner for any concerns.             Impression:  Acute right wrist pain with some anatomic snuffbox tenderness negative initial x-rays  Plan:  Thumb spica splint elevate ibuprofen recheck 1 week if still painful repeat wrist x-rays consider MRI at that time to rule out occult scaphoid fracture and/or hook of hamate fracture

## 2020-07-07 ENCOUNTER — OFFICE VISIT (OUTPATIENT)
Dept: ORTHOPEDIC SURGERY | Facility: CLINIC | Age: 35
End: 2020-07-07

## 2020-07-07 VITALS — BODY MASS INDEX: 29.16 KG/M2 | RESPIRATION RATE: 18 BRPM | WEIGHT: 175 LBS | HEIGHT: 65 IN

## 2020-07-07 DIAGNOSIS — M25.531 ARTHRALGIA OF RIGHT WRIST: ICD-10-CM

## 2020-07-07 DIAGNOSIS — S60.211A CONTUSION OF RIGHT WRIST, INITIAL ENCOUNTER: Primary | ICD-10-CM

## 2020-07-07 PROCEDURE — 99213 OFFICE O/P EST LOW 20 MIN: CPT | Performed by: ORTHOPAEDIC SURGERY

## 2020-07-07 RX ORDER — MELOXICAM 15 MG/1
15 TABLET ORAL DAILY
Qty: 30 TABLET | Refills: 1 | Status: SHIPPED | OUTPATIENT
Start: 2020-07-07 | End: 2021-01-22

## 2020-07-07 NOTE — PROGRESS NOTES
Subjective   Patient ID: Hermila Sierra is a 35 y.o. female  Follow-up and Pain of the Right Wrist (Patient is here today for a 1 week follow up on her right wrist,she states she is still in a lot of pain.)             History of Present Illness  Right-hand-dominant female with continued pain in the volar aspect of her right wrist after a fall less anatomic snuffbox tenderness but still swelling and tender to specialist last couple days she was active doing a lot of dancing and did some camping over the July 4 weekend.      Review of Systems   Constitutional: Negative for fever.   HENT: Negative for dental problem and voice change.    Eyes: Negative for visual disturbance.   Respiratory: Negative for shortness of breath.    Cardiovascular: Negative for chest pain.   Gastrointestinal: Negative for abdominal pain.   Genitourinary: Negative for dysuria.   Musculoskeletal: Positive for arthralgias. Negative for gait problem and joint swelling.   Skin: Negative for rash.   Neurological: Negative for speech difficulty.   Hematological: Does not bruise/bleed easily.   Psychiatric/Behavioral: Negative for confusion.       Past Medical History:   Diagnosis Date   • Abnormal Pap smear of cervix    • Migraine         Past Surgical History:   Procedure Laterality Date   • DILATATION AND CURETTAGE     • TUBAL ABDOMINAL LIGATION  2010   • WISDOM TOOTH EXTRACTION         Family History   Problem Relation Age of Onset   • Hypertension Father    • Heart disease Father    • Diabetes Father    • Diabetes Brother    • Diabetes Mother    • Breast cancer Paternal Grandmother    • Breast cancer Paternal Aunt        Social History     Socioeconomic History   • Marital status:      Spouse name: Not on file   • Number of children: Not on file   • Years of education: Not on file   • Highest education level: Not on file   Tobacco Use   • Smoking status: Former Smoker     Years: 4.00     Last attempt to quit: 2010     Years since quitting:  "10.5   • Smokeless tobacco: Never Used   Substance and Sexual Activity   • Alcohol use: No   • Drug use: No   • Sexual activity: Yes     Partners: Male     Birth control/protection: Surgical       I have reviewed the medical and surgical history, family history, social history, medications, and/or allergies, and the review of systems of this report.    Allergies   Allergen Reactions   • Sulfa Antibiotics Unknown (See Comments)               Current Outpatient Medications:   •  Levonorgest-Eth Estrad 91-Day 0.1-0.02 & 0.01 MG tablet, Take 1 tablet by mouth Daily., Disp: 91 tablet, Rfl: 4  •  OnabotulinumtoxinA 200 units reconstituted solution, Inject 200 units IM into head neck shoulders every 12 weeks per FDA protocol. Dx:G43.709, Disp: 1 each, Rfl: 3  •  Ubrogepant (ubrogepant) 100 MG tablet, Take 1 tablet by mouth As Needed (for headache). May repeat once in 2 hours.  Do not take over 2 tabs a day or 8 tabs a month, Disp: 8 tablet, Rfl: 3    Objective   Resp 18   Ht 165.1 cm (65\")   Wt 79.4 kg (175 lb)   BMI 29.12 kg/m²    Physical Exam  Constitutional: Patient is oriented to person, place, and time. Patient appears well-developed and well-nourished.   HENT:Head: Normocephalic and atraumatic.   Eyes: EOM are normal. Pupils are equal, round, and reactive to light.   Neck: Normal range of motion. Neck supple.   Cardiovascular: Normal rate.    Pulmonary/Chest: Effort normal and breath sounds normal.   Abdominal: Soft.   Neurological: Patient is alert and oriented to person, place, and time.   Skin: Skin is warm and dry.   Psychiatric: Patient has a normal mood and affect.   Nursing note and vitals reviewed.       [unfilled]   Right wrist: Some tenderness over the volar flexor carpi radialis distal insertional area with minimal swelling no erythema she said it did turn yellow at one point but currently looks of normal color.  There is restricted dorsiflexion and volar flexion with pain over the radial aspect of " the wrist dorsally and volarly and at the anatomic snuffbox.  The hand otherwise appears neurovascularly intact.  Supination pronation is minimally painful at the DRUJ there is no proximal forearm pain or elbow pain with range of motion.    Assessment/Plan   Review of Radiographic Studies:    Radiographic images today of affected area I personally viewed and showed no sign of acute fracture or dislocation.      Procedures     Hermila was seen today for follow-up and pain.    Diagnoses and all orders for this visit:    Contusion of right wrist, initial encounter    Arthralgia of right wrist  -     XR Wrist 3+ View Right       Orthopedic activities reviewed and patient expressed appreciation, Risk, benefits, and merits of treatment alternatives reviewed with the patient and questions answered and The nature of the proposed surgery reviewed with the patient including risks, benefits, rehabilitation, recovery timeframe, and outcome expectations      Recommendations/Plan:   Work/Activity Status: May perform usual activities as tolerated    Patient agreeable to call or return sooner for any concerns.             Impression:  Persistent right wrist pain rule out scaphoid fracture  Plan:  MR right wrist continue splint immobilization use meloxicam in place of ibuprofen, if MRI negative for fracture we will plan to refer to foundation therapy for hand PT

## 2020-07-17 ENCOUNTER — PROCEDURE VISIT (OUTPATIENT)
Dept: NEUROLOGY | Facility: CLINIC | Age: 35
End: 2020-07-17

## 2020-07-17 VITALS
SYSTOLIC BLOOD PRESSURE: 112 MMHG | HEART RATE: 93 BPM | BODY MASS INDEX: 29.16 KG/M2 | DIASTOLIC BLOOD PRESSURE: 74 MMHG | TEMPERATURE: 98.6 F | HEIGHT: 65 IN | OXYGEN SATURATION: 99 % | WEIGHT: 175 LBS

## 2020-07-17 DIAGNOSIS — G43.009 MIGRAINE WITHOUT AURA AND WITHOUT STATUS MIGRAINOSUS, NOT INTRACTABLE: Primary | ICD-10-CM

## 2020-07-17 PROCEDURE — 64615 CHEMODENERV MUSC MIGRAINE: CPT | Performed by: PSYCHIATRY & NEUROLOGY

## 2020-07-17 NOTE — PROGRESS NOTES
Botox Procedure Note-    Current headache frequency: 10-15__ headaches days / month (about one a week).     The risks and benefits were discussed with the patient. The patient was given the opportunity to ask questions. Informed consent form was signed.     Onabotulinumtoxin A was reconstituted with 0.9% normal saline. All injections sites were prepped with alcohol swab. Approximately 5 units of botox were injected into each of the following sites  as per routine migraine botox injection PREEMPT protocol:  (2 injections), procerus (1 injection), frontalis (4 injections), temporalis (8 injections), occipitalis (6 injections) cervical, upper cervical paraspinals (4 injections), and trapezius (6 injections) for a total of 31 sites.  The patient tolerated the procedure well. There were no immediate complications. The patient will follow up in approximately 12 weeks for her next injection.     Total amount of onabotulinumtoxin A injected was 155 units with 45 units wasted.     Additional notes:  Patient's last Botox was in January which she got from [facility].  Her headaches have worsened since she could not get her last Botox injection.  For her hormonal headaches patient was prescribed a low-dose oral contraceptive pill however she has not started that as yet and iron supplements as her ferritin level was very low.  She also tried Ubrelvy 100 mg for abortive treatment of her headaches which has helped .  -Botox was patient supplied.

## 2020-07-24 ENCOUNTER — HOSPITAL ENCOUNTER (OUTPATIENT)
Dept: MRI IMAGING | Facility: HOSPITAL | Age: 35
Discharge: HOME | End: 2020-07-24
Admitting: ORTHOPAEDIC SURGERY

## 2020-07-24 PROCEDURE — 73221 MRI JOINT UPR EXTREM W/O DYE: CPT

## 2020-07-29 ENCOUNTER — OFFICE VISIT (OUTPATIENT)
Dept: ORTHOPEDIC SURGERY | Facility: CLINIC | Age: 35
End: 2020-07-29

## 2020-07-29 VITALS — RESPIRATION RATE: 18 BRPM | BODY MASS INDEX: 29.16 KG/M2 | HEIGHT: 65 IN | WEIGHT: 175 LBS

## 2020-07-29 DIAGNOSIS — S60.211D CONTUSION OF RIGHT WRIST, SUBSEQUENT ENCOUNTER: Primary | ICD-10-CM

## 2020-07-29 NOTE — PROGRESS NOTES
You have chosen to receive care through a telephone visit. Do you consent to use a telephone visit for your medical care today? Yes    Phone conversation today with results of her MRI of her wrist.  She states that she still still does have occasional soreness and pain in the anatomic snuffbox area when she tries to push down on her hand lifting herself out of a chair or lifting something heavy she still experiences pain there.  Her recent MRI did show bone bruise of the scaphoid no clear fracture line was identified slight swelling around the flexor tendons were also noted.  She was advised to continue with use of her splint avoid heavy lifting recheck with me in 2 to 3 weeks if still painful at that time repeat x-rays will be done, I told her to avoid any real heavy use of the hand lifting or pushing on it for at least the next 2 or 3 weeks.  Her questions were answered and she was satisfied with the discussion and will call to make an appointment for her next visit.

## 2020-08-07 ENCOUNTER — OFFICE VISIT (OUTPATIENT)
Dept: OBSTETRICS AND GYNECOLOGY | Facility: CLINIC | Age: 35
End: 2020-08-07

## 2020-08-07 VITALS
BODY MASS INDEX: 28.28 KG/M2 | DIASTOLIC BLOOD PRESSURE: 80 MMHG | HEIGHT: 66 IN | SYSTOLIC BLOOD PRESSURE: 128 MMHG | WEIGHT: 176 LBS

## 2020-08-07 DIAGNOSIS — N87.0 MILD DYSPLASIA OF CERVIX (CIN I): Primary | ICD-10-CM

## 2020-08-07 PROCEDURE — 57454 BX/CURETT OF CERVIX W/SCOPE: CPT | Performed by: OBSTETRICS & GYNECOLOGY

## 2020-08-09 NOTE — PROGRESS NOTES
Colposcopy    Date of procedure:  8/9/2020    Risks and benefits discussed? yes  All questions answered? yes  Consents given by the patient  Written consent obtained? yes    Pre-op indication: LGSIL - mild dysplasia  Procedure documentation:    The patient was placed in the dorsal lithotomy position.  A speculum was inserted into the vagina for visualization.  The area was washed with 3% acetic acid.  The colposcope was used to examine the cervix.  The following findings were noted:        The transformation zone was able to be seen adequately.    Acetowhite noted at 1 o'clock and 7 o'clock    Ectocervical biopsies were taken from 1 o'clock and 7 o'clock.    An ECC was performed.                           Hemostasis was achieved with Ferric Subsulfate and tampon placement    Colposcopic Impression: 1. Adequate colposcopy  2. Colposcopic findings are consistent with PAP       Plan: Will base further treatment on pathology results    Instructions and Precautions were given:  Nothing in the vagina for 7 days; may use the OTC medications for pain relief as needed.  Bloody to dark brown discharge is to be expected after the procedure.  Call if heavy bleeding that is heavier than a period, pain not relieved with OTC medications, severe cramping or fever.  The patient was also informed to remove the tampon in 2-3 hours if one was placed as noted.      This note was electronically signed.  Randa Mcghee M.D.

## 2020-08-26 ENCOUNTER — TELEPHONE (OUTPATIENT)
Dept: NEUROLOGY | Facility: CLINIC | Age: 35
End: 2020-08-26

## 2020-09-01 ENCOUNTER — TELEMEDICINE (OUTPATIENT)
Dept: NEUROLOGY | Facility: CLINIC | Age: 35
End: 2020-09-01

## 2020-09-01 DIAGNOSIS — G43.009 MIGRAINE WITHOUT AURA AND WITHOUT STATUS MIGRAINOSUS, NOT INTRACTABLE: Primary | ICD-10-CM

## 2020-09-01 PROCEDURE — 99212 OFFICE O/P EST SF 10 MIN: CPT | Performed by: PSYCHIATRY & NEUROLOGY

## 2020-09-01 NOTE — PROGRESS NOTES
Subjective:    CC: Hermila Sierra is seen today via video visit  headaches    HPI:  Current visit- patient states that since her last Botox 6 weeks ago her headaches have improved significantly and she has only had 2 headaches in the past month.  These were relieved by Ubrelvy 100 mg.  She is currently under a lot of stress because of working at home and home schooling her 3 kids but the headaches remain well controlled.    Initial [id number] female with no significant past medical history presents with headaches.  As per patient she has had headaches since she was a teenager however they worsened while she was pregnant with her third child who is now 10.  In November 2017 patient had a severe thunderclap headache in the back of her head.  She had a CT head at the time with did not show any hemorrhage.  After that she started to have frequent headaches and was tried on several different medications including propranolol that made her blood pressure drop,  amitriptyline that made her dull, Topamax that caused dizziness, Trokendi that was not approved by insurance and Imitrex that made her very sleepy and caused burning in the back of her head.  In October 2018 she started to get Botox from [employer].  That helped with her headaches tremendously and she now has 1 migraine headache every few months for which she takes over-the-counter medications that may or may not help.  However she continues to have a few dull headaches each month around her menstrual cycle.  These started after she went off birth control.  Has had a dull headache for the past 3 days now which was not relieved by taking Tylenol or ibuprofen.  She also had a MRI brain in October 2019 that did not show any acute intracranial abnormalities or chronic ischemic changes.    The following portions of the patient's history were reviewed today and updated as of 05/08/2020  : allergies, current medications, past family history, past medical history,  past social history, past surgical history and problem list  These document will be scanned to patient's chart.      Current Outpatient Medications:   •  meloxicam (MOBIC) 15 MG tablet, Take 1 tablet by mouth Daily., Disp: 30 tablet, Rfl: 1  •  OnabotulinumtoxinA 200 units reconstituted solution, Inject 200 units IM into head neck shoulders every 12 weeks per FDA protocol. Dx:G43.709, Disp: 1 each, Rfl: 3  •  Ubrogepant (ubrogepant) 100 MG tablet, Take 1 tablet by mouth As Needed (for headache). May repeat once in 2 hours.  Do not take over 2 tabs a day or 8 tabs a month, Disp: 8 tablet, Rfl: 3    Current Facility-Administered Medications:   •  OnabotulinumtoxinA 200 Units, 200 Units, Intramuscular, Q3 Months, Sravani Mccauley MD, 200 Units at 07/17/20 1300   Past Medical History:   Diagnosis Date   • Abnormal Pap smear of cervix 2005   • Abnormal Pap smear of cervix 06/18/2020    LSIL   • HPV (human papilloma virus) infection    • Migraine       Past Surgical History:   Procedure Laterality Date   • DILATATION AND CURETTAGE     • TUBAL ABDOMINAL LIGATION  2010   • WISDOM TOOTH EXTRACTION        Family History   Problem Relation Age of Onset   • Hypertension Father    • Heart disease Father    • Diabetes Father    • Diabetes Brother    • Diabetes Mother    • Breast cancer Paternal Grandmother    • Breast cancer Paternal Aunt       Social History     Socioeconomic History   • Marital status:      Spouse name: Not on file   • Number of children: Not on file   • Years of education: Not on file   • Highest education level: Not on file   Tobacco Use   • Smoking status: Former Smoker     Years: 4.00     Last attempt to quit: 2010     Years since quitting: 10.6   • Smokeless tobacco: Never Used   Substance and Sexual Activity   • Alcohol use: No   • Drug use: No   • Sexual activity: Yes     Partners: Male     Birth control/protection: Surgical     Review of Systems   Neurological: Positive for headache.   All other  systems reviewed and are negative.      Objective:    There were no vitals taken for this visit.    Neurology Exam:    General apperance: NAD.     Mental status: Alert, awake and oriented to time place and person.    Recent and Remote memory: Intact.    Attention span and Concentration: Normal.     Language and Speech: Intact- No dysarthria.    Fluency, Naming , Repitition and Comprehension:  Intact    Cranial Nerves:   CN II: Visual fields are full. Intact. Fundi - Normal, No papillederma, Pupils - TOM  CN III, IV and VI: Extraocular movements are intact. Normal saccades.   CN V: Facial sensation is intact.   CN VII: Muscles of facial expression reveal no asymmetry. Intact.   CN VIII: Hearing is intact. Whispered voice intact.   CN IX and X: Palate elevates symmetrically. Intact  CN XI: Shoulder shrug is intact.   CN XII: Tongue is midline without evidence of atrophy or fasciculation.       Motor:  Normal    Gait: Normal.      Assessment and Plan:  1. Migraine without aura and without status migrainosus, not intractable  Patient has a combination of migraine and hormonal headaches  -We will see her back in 6 weeks for her second Botox cycle  -She can continue Ubrelvy 100 mg as needed  -For her hormonal headaches she is currently on a low-dose oral contraceptive pill along with iron supplements    Follow-up in 6 weeks for Botox      Sravani Mccauley MD

## 2020-09-24 ENCOUNTER — TELEPHONE (OUTPATIENT)
Dept: OBSTETRICS AND GYNECOLOGY | Facility: CLINIC | Age: 35
End: 2020-09-24

## 2020-09-24 NOTE — TELEPHONE ENCOUNTER
----- Message from Cordelia Biswas sent at 9/24/2020 10:25 AM EDT -----  Regarding: BTB  Contact: PT  THIS IS DR WILKINSON'S PT.  SHE CALLED AND SAID SHE HAS BEEN HAVING BREAK THROUGH BLEEDING ON SEASONIQUE FOR 7 DAYS.  SHE HEARD THAT IF THIS HAPPENS, YOU SHOULD TAKE A 4 DAY BREAK AND RESTART OC'S.  SHE WANTED TO SEE WHAT DR WILKINSON RECOMMENDED.  PLEASE CALL HER BACK AT  [phone].  THANKS

## 2020-09-25 NOTE — TELEPHONE ENCOUNTER
No do not take a break as this can make her have a withdrawal bleed but she can double up on pills for 3 days; thank you.

## 2020-10-08 ENCOUNTER — TELEPHONE (OUTPATIENT)
Dept: OBSTETRICS AND GYNECOLOGY | Facility: CLINIC | Age: 35
End: 2020-10-08

## 2020-10-08 NOTE — TELEPHONE ENCOUNTER
----- Message from Hermila Sierra sent at 10/8/2020 11:51 AM EDT -----  Regarding: Non-Urgent Medical Question  Contact: [phone]  I've had breakthrough bleeding on this birth control for three weeks. (20 days) we leave for vacation tomorrow and I'm wondering if there is anything that can be done to make it stop? I started it on Aug 23. After deciding not to, I ended up deciding to.    Can you help at all? Thank you!

## 2020-10-08 NOTE — TELEPHONE ENCOUNTER
She can double up again for 3-4 days only if she wants to try this. It looks like she just started seasonale a few months ago and with this type of pill it can take 3-6 months to get where she only has a bleed every three months as breakthrough bleeding is common for the first few months with extended cycle pill.

## 2020-10-08 NOTE — TELEPHONE ENCOUNTER
Patient advised she did this at the first of last week and it didn't help, wanted to see if it is okay to take like this again?

## 2020-10-22 ENCOUNTER — PROCEDURE VISIT (OUTPATIENT)
Dept: NEUROLOGY | Facility: CLINIC | Age: 35
End: 2020-10-22

## 2020-10-22 VITALS
OXYGEN SATURATION: 99 % | BODY MASS INDEX: 28.42 KG/M2 | HEIGHT: 66 IN | TEMPERATURE: 98.4 F | SYSTOLIC BLOOD PRESSURE: 112 MMHG | DIASTOLIC BLOOD PRESSURE: 84 MMHG | HEART RATE: 82 BPM

## 2020-10-22 DIAGNOSIS — G43.009 MIGRAINE WITHOUT AURA AND WITHOUT STATUS MIGRAINOSUS, NOT INTRACTABLE: Primary | ICD-10-CM

## 2020-10-22 PROCEDURE — 64615 CHEMODENERV MUSC MIGRAINE: CPT | Performed by: PSYCHIATRY & NEUROLOGY

## 2020-10-22 RX ORDER — LEVONORGESTREL AND ETHINYL ESTRADIOL 100-20(84)
KIT ORAL
COMMUNITY
Start: 2020-10-18 | End: 2020-11-20

## 2020-10-22 RX ORDER — AMITRIPTYLINE HYDROCHLORIDE 10 MG/1
10 TABLET, FILM COATED ORAL NIGHTLY
Qty: 30 TABLET | Refills: 5 | Status: SHIPPED | OUTPATIENT
Start: 2020-10-22 | End: 2021-01-22

## 2020-10-22 NOTE — PROGRESS NOTES
Botox Procedure Note-    Current headache frequency: 5-6_ headaches days / month (about one a week).     The risks and benefits were discussed with the patient. The patient was given the opportunity to ask questions. Informed consent form was signed.     Onabotulinumtoxin A was reconstituted with 0.9% normal saline. All injections sites were prepped with alcohol swab. Approximately 5 units of botox were injected into each of the following sites  as per routine migraine botox injection PREEMPT protocol:  (2 injections), procerus (1 injection), frontalis (4 injections), temporalis (8 injections), occipitalis (6 injections) cervical, upper cervical paraspinals (4 injections), and trapezius (6 injections) for a total of 31 sites.  The patient tolerated the procedure well. There were no immediate complications. The patient will follow up in approximately 12 weeks for her next injection.     Total amount of onabotulinumtoxin A injected was 155 units with 45 units wasted.     Additional notes:  Patient states that her headache frequency has improved a lot.  She now has about 2 severe headaches each month for which she takes Ubrelvy 100 mg.  Is currently also having about 1 dull headache each week for which she is taking Tylenol.  Attributes this to stress from not sleeping too well, working from home and home schooling her children.  I will prescribe her amitriptyline 10 mg at night.  -Botox was patient supplied.

## 2020-10-28 ENCOUNTER — LAB (OUTPATIENT)
Dept: LAB | Facility: HOSPITAL | Age: 35
End: 2020-10-28

## 2020-10-28 ENCOUNTER — OFFICE VISIT (OUTPATIENT)
Dept: OBSTETRICS AND GYNECOLOGY | Facility: CLINIC | Age: 35
End: 2020-10-28

## 2020-10-28 VITALS
BODY MASS INDEX: 28.93 KG/M2 | HEIGHT: 66 IN | SYSTOLIC BLOOD PRESSURE: 118 MMHG | WEIGHT: 180 LBS | DIASTOLIC BLOOD PRESSURE: 68 MMHG

## 2020-10-28 DIAGNOSIS — G43.909 MIGRAINE WITHOUT STATUS MIGRAINOSUS, NOT INTRACTABLE, UNSPECIFIED MIGRAINE TYPE: ICD-10-CM

## 2020-10-28 DIAGNOSIS — R79.0 LOW FERRITIN LEVEL: ICD-10-CM

## 2020-10-28 DIAGNOSIS — N92.1 MENORRHAGIA WITH IRREGULAR CYCLE: ICD-10-CM

## 2020-10-28 DIAGNOSIS — N92.1 MENORRHAGIA WITH IRREGULAR CYCLE: Primary | ICD-10-CM

## 2020-10-28 LAB
DEPRECATED RDW RBC AUTO: 40.4 FL (ref 37–54)
ERYTHROCYTE [DISTWIDTH] IN BLOOD BY AUTOMATED COUNT: 12 % (ref 12.3–15.4)
HCT VFR BLD AUTO: 40.6 % (ref 34–46.6)
HGB BLD-MCNC: 13.4 G/DL (ref 12–15.9)
MCH RBC QN AUTO: 30.1 PG (ref 26.6–33)
MCHC RBC AUTO-ENTMCNC: 33 G/DL (ref 31.5–35.7)
MCV RBC AUTO: 91.2 FL (ref 79–97)
PLATELET # BLD AUTO: 233 10*3/MM3 (ref 140–450)
PMV BLD AUTO: 10.9 FL (ref 6–12)
RBC # BLD AUTO: 4.45 10*6/MM3 (ref 3.77–5.28)
WBC # BLD AUTO: 7.28 10*3/MM3 (ref 3.4–10.8)

## 2020-10-28 PROCEDURE — 36415 COLL VENOUS BLD VENIPUNCTURE: CPT | Performed by: OBSTETRICS & GYNECOLOGY

## 2020-10-28 PROCEDURE — 99214 OFFICE O/P EST MOD 30 MIN: CPT | Performed by: OBSTETRICS & GYNECOLOGY

## 2020-10-28 PROCEDURE — 82728 ASSAY OF FERRITIN: CPT | Performed by: OBSTETRICS & GYNECOLOGY

## 2020-10-28 PROCEDURE — 84479 ASSAY OF THYROID (T3 OR T4): CPT | Performed by: OBSTETRICS & GYNECOLOGY

## 2020-10-28 PROCEDURE — 84436 ASSAY OF TOTAL THYROXINE: CPT | Performed by: OBSTETRICS & GYNECOLOGY

## 2020-10-28 PROCEDURE — 85027 COMPLETE CBC AUTOMATED: CPT | Performed by: OBSTETRICS & GYNECOLOGY

## 2020-10-28 PROCEDURE — 84443 ASSAY THYROID STIM HORMONE: CPT | Performed by: OBSTETRICS & GYNECOLOGY

## 2020-10-28 PROCEDURE — 83540 ASSAY OF IRON: CPT

## 2020-10-28 PROCEDURE — 84466 ASSAY OF TRANSFERRIN: CPT

## 2020-10-28 NOTE — PROGRESS NOTES
Subjective  Chief Complaint   Patient presents with   • Vaginal Bleeding     Patient advised bleeding x 4 weeks on oral contraceptives.      Patient is 35 y.o.  here for evaluation of vaginal bleeding that has been present now for 4 weeks.  Patient started on Seasonale on .  Patient had bleeding that then lasted for approximately 3 weeks.  Patient reports she had to do an oral contraceptive taper.  The first taper did not help.  Patient took 2 pills for 3 days.  Patient reports having another episode of bleeding on .  Patient had to do a second taper.  Patient took this for 4 days.  Patient does report resolution of the bleeding with the second course.  Patient reports having the onset of bleeding again 4 weeks ago.  She has continued to have bleeding since that time.  Patient reports it is light in nature.  Patient has been changing a pad every 4-6 hours.  Patient has tolerated the oral contraceptives well.  She did recently receive Botox for her migraines.  She reports no worsening of her migraines since being on the oral contraceptives.  Patient is now seeing Dr. Mccauley in [place] for neurology.  Patient does report having a history of a low ferritin level in the past.  Patient would like to have labs checked today.  Patient is also concerned as she reports in 2017 having an acute onset of a severe headache.  Patient reports this was the worst headache of her life.  She was seen in the emergency room at [facility].  Patient reports the ER doctor told her he could not rule out a small bleed.  Patient reports that is when she started having migraines.  She has had an MRI since seeing the neurologist.  Her MRI is reviewed today and was normal.    History  Past Medical History:   Diagnosis Date   • Abnormal Pap smear of cervix    • Abnormal Pap smear of cervix 2020    LSIL   • HPV (human papilloma virus) infection    • Migraine      Current Outpatient Medications on File Prior  to Visit   Medication Sig Dispense Refill   • amitriptyline (ELAVIL) 10 MG tablet Take 1 tablet by mouth Every Night. 30 tablet 5   • Levonorgest-Eth Estrad 91-Day 0.1-0.02 & 0.01 MG tablet      • meloxicam (MOBIC) 15 MG tablet Take 1 tablet by mouth Daily. 30 tablet 1   • OnabotulinumtoxinA 200 units reconstituted solution Inject 200 units IM into head neck shoulders every 12 weeks per FDA protocol. Dx:G43.709 1 each 3   • Ubrogepant (ubrogepant) 100 MG tablet Take 1 tablet by mouth As Needed (for headache). May repeat once in 2 hours.  Do not take over 2 tabs a day or 8 tabs a month 8 tablet 3     Current Facility-Administered Medications on File Prior to Visit   Medication Dose Route Frequency Provider Last Rate Last Dose   • OnabotulinumtoxinA 200 Units  200 Units Intramuscular Q3 Months Sravani Mccauley MD   200 Units at 10/22/20 1630     Allergies   Allergen Reactions   • Sulfa Antibiotics Unknown (See Comments)           Past Surgical History:   Procedure Laterality Date   • DILATATION AND CURETTAGE     • TUBAL ABDOMINAL LIGATION  2010   • WISDOM TOOTH EXTRACTION       Family History   Problem Relation Age of Onset   • Hypertension Father    • Heart disease Father    • Diabetes Father    • Diabetes Brother    • Diabetes Mother    • Breast cancer Paternal Grandmother    • Breast cancer Paternal Aunt      Social History     Socioeconomic History   • Marital status:      Spouse name: Not on file   • Number of children: Not on file   • Years of education: Not on file   • Highest education level: Not on file   Tobacco Use   • Smoking status: Former Smoker     Years: 4.00     Quit date: 2010     Years since quitting: 10.8   • Smokeless tobacco: Never Used   Substance and Sexual Activity   • Alcohol use: No   • Drug use: No   • Sexual activity: Yes     Partners: Male     Birth control/protection: OCP, Surgical       Review of Systems  All systems were reviewed and negative except for:  Genitourinary: postivie  "for  abnormal menstrual bleeding  Neurological: positive for  headaches  Objective  Vitals:    10/28/20 1537   BP: 118/68   Weight: 81.6 kg (180 lb)   Height: 167.6 cm (66\")     Physical Exam:  General Appearance: alert, appears stated age and cooperative  Head: normocephalic, without obvious abnormality and atraumatic  Eyes: lids and lashes normal, conjunctivae and sclerae normal, no icterus, no pallor, corneas clear and PERRLA  Ears: ears appear intact with no abnormalities noted  Nose: nares normal, septum midline, mucosa normal and no drainage  Neck: suppple, trachea midline and no thyromegaly  Lungs: clear to auscultation, respirations regular, respirations even and respirations unlabored  Heart: regular rhythm and normal rate, normal S1, S2, no murmur, gallop, or rubs and no click  Breasts: Not performed.  Abdomen: normal bowel sounds, no masses, no hepatomegaly, no splenomegaly, soft non-tender, no guarding and no rebound tenderness  Pelvic: Not performed.  Extremities: moves extremities well, no edema, no cyanosis and no redness  Skin: no bleeding, bruising or rash and no lesions noted  Lymph Nodes: no palpable adenopathy  Neuro: CN II-X grossly intact; sensation intact  Psych: normal mood and affect, oriented to person, time and place, thought content organized and appropriate judgment  Lab Review   No data reviewed    Imaging   Brain MRI  Results for orders placed during the hospital encounter of 10/17/19   MRI Brain Without Contrast    Narrative PROCEDURE: MRI BRAIN WO CONTRAST-     HISTORY: headaches, diplopia; G43.119-Migraine with aura, intractable,  without status migrainosus     PROCEDURE: Multiplanar multisequence imaging of the brain was performed  without the use of intravenous contrast.     COMPARISON: None.     FINDINGS: There are no significant white matter abnormalities. There is  no mass, mass effect or midline shift. There is no hydrocephalus. There  are no areas of restricted diffusion.   "   The midbrain, mateo, cerebellum and craniocervical junction are  unremarkable. The sella and pituitary gland are within normal limits.  The major intracranial vasculature demonstrates the expected flow  related signal. The paranasal sinuses are clear.       Impression Normal MRI of the brain.           This report was finalized on 10/18/2019 8:27 AM by Angeles Cline M.D..           Decision to Obtain Medical Records  yes    Summary of Medical Records  No    Assessment/Plan  Problems Addressed this Visit     None      Visit Diagnoses     Menorrhagia with irregular cycle    -  Primary Worsening  Patient with irregular cycles on oral contraceptives.  Will obtain labs today as noted.  May consider higher dose of Seasonale pending the results of her labs.  Patient is to go ahead and start an oral contraceptive taper at this time.  Plan pending response to treatment as well as lab results.    Relevant Orders    CBC (No Diff) (Completed)    Ferritin (Completed)    Thyroid Panel With TSH (Completed)    Iron Profile (Completed)    Migraine without status migrainosus, not intractable, unspecified migraine type    Unchanged  Patient with a long history of migraines.  Her migraines have been altered by her oral contraceptives.  She recently received Botox injection.  Her MRI from last year is reviewed today as noted.  Patient is to sign to obtain a copy of her records from [facility].    Low ferritin level    New  Patient with a history of low ferritin levels in the past.  Will obtain labs today as noted.  Instructions and precautions are given.  Plan pending results.    Relevant Orders    CBC (No Diff) (Completed)    Ferritin (Completed)    Iron Profile (Completed)      Diagnoses       Codes Comments    Menorrhagia with irregular cycle    -  Primary ICD-10-CM: N92.1  ICD-9-CM: 626.2     Migraine without status migrainosus, not intractable, unspecified migraine type     ICD-10-CM: G43.909  ICD-9-CM: 346.90     Low  ferritin level     ICD-10-CM: R79.0  ICD-9-CM: 790.6                Follow up as discussed/scheduled  Note: Speech recognition transcription software may have been used to dictate portions of this document.  An attempt at proofreading has been made though minor errors in transcription may still be present.  This note was electronically signed.  Randa Mcghee M.D.

## 2020-10-29 LAB
FERRITIN SERPL-MCNC: 30.1 NG/ML (ref 13–150)
IRON 24H UR-MRATE: 81 MCG/DL (ref 37–145)
IRON SATN MFR SERPL: 15 % (ref 20–50)
T-UPTAKE NFR SERPL: 1.34 TBI (ref 0.8–1.3)
T4 SERPL-MCNC: 11.1 MCG/DL (ref 4.5–11.7)
TIBC SERPL-MCNC: 533 MCG/DL (ref 298–536)
TRANSFERRIN SERPL-MCNC: 358 MG/DL (ref 200–360)
TSH SERPL DL<=0.05 MIU/L-ACNC: 1.56 UIU/ML (ref 0.27–4.2)

## 2020-11-18 ENCOUNTER — TELEPHONE (OUTPATIENT)
Dept: OBSTETRICS AND GYNECOLOGY | Facility: CLINIC | Age: 35
End: 2020-11-18

## 2020-11-18 NOTE — TELEPHONE ENCOUNTER
----- Message from Hermila Sierra sent at 11/18/2020  9:46 AM EST -----  Regarding: Non-Urgent Medical Question  Contact: [phone]  Hi there! I am still continuously spotting.  If I double up on pills it stops. Then when I go back to one it starts and doesn't stop. Could this be because I am on a low dose pill? Can I try the full dose and see if it helps? Should I double up again (it's been a week and a half since I have) to make it stop? If I start a new pill will insurance cover it since I already have a three month supply? Help?!  Thank you!!!

## 2020-11-20 RX ORDER — LEVONORGESTREL AND ETHINYL ESTRADIOL 0.15-0.03
1 KIT ORAL DAILY
Qty: 91 TABLET | Refills: 4 | Status: SHIPPED | OUTPATIENT
Start: 2020-11-20 | End: 2021-05-03

## 2020-11-20 NOTE — TELEPHONE ENCOUNTER
Okay to inform patient I sent in a higher dose OCP.  Patient is to call if continued irregular bleeding.  Thank you.

## 2020-12-10 ENCOUNTER — TELEPHONE (OUTPATIENT)
Dept: OBSTETRICS AND GYNECOLOGY | Facility: CLINIC | Age: 35
End: 2020-12-10

## 2020-12-10 RX ORDER — FLUCONAZOLE 150 MG/1
TABLET ORAL
Qty: 2 TABLET | Refills: 0 | Status: SHIPPED | OUTPATIENT
Start: 2020-12-10 | End: 2021-01-22

## 2020-12-10 NOTE — TELEPHONE ENCOUNTER
----- Message from Hermila Sierra sent at 12/10/2020 12:20 PM EST -----  Regarding: Non-Urgent Medical Question  Contact: [phone]  Hi! The new pill is doing much better so thank you for your help with that!   I woke up this morning with a yeast infection and I would typically get something over the counter but I have been running a low grade fever since last night and feeling a little crummy.  I was wondering if you could call something in? That way I could get it through the drive thru and be done with it? Thanks again for all you do!

## 2021-01-22 ENCOUNTER — PROCEDURE VISIT (OUTPATIENT)
Dept: NEUROLOGY | Facility: CLINIC | Age: 36
End: 2021-01-22

## 2021-01-22 VITALS
DIASTOLIC BLOOD PRESSURE: 82 MMHG | TEMPERATURE: 97.8 F | SYSTOLIC BLOOD PRESSURE: 122 MMHG | HEART RATE: 97 BPM | OXYGEN SATURATION: 98 % | WEIGHT: 186.4 LBS | HEIGHT: 65 IN | BODY MASS INDEX: 31.06 KG/M2

## 2021-01-22 DIAGNOSIS — G43.009 MIGRAINE WITHOUT AURA AND WITHOUT STATUS MIGRAINOSUS, NOT INTRACTABLE: Primary | ICD-10-CM

## 2021-01-22 PROCEDURE — 64615 CHEMODENERV MUSC MIGRAINE: CPT | Performed by: PSYCHIATRY & NEUROLOGY

## 2021-01-22 RX ORDER — NORTRIPTYLINE HYDROCHLORIDE 10 MG/1
10 CAPSULE ORAL NIGHTLY
Qty: 30 CAPSULE | Refills: 2 | Status: SHIPPED | OUTPATIENT
Start: 2021-01-22 | End: 2021-04-29

## 2021-01-22 NOTE — PROGRESS NOTES
Botox Procedure Note-    Current headache frequency: 5-6_ headaches days / month .     The risks and benefits were discussed with the patient. The patient was given the opportunity to ask questions. Informed consent form was signed.     Onabotulinumtoxin A was reconstituted with 0.9% normal saline. All injections sites were prepped with alcohol swab. Approximately 5 units of botox were injected into each of the following sites  as per routine migraine botox injection PREEMPT protocol:  (2 injections), procerus (1 injection), frontalis (4 injections), temporalis (8 injections), occipitalis (6 injections) cervical, upper cervical paraspinals (4 injections), and trapezius (6 injections) for a total of 31 sites.  The patient tolerated the procedure well. There were no immediate complications. The patient will follow up in approximately 12 weeks for her next injection.     Total amount of onabotulinumtoxin A injected was 155 units with 45 units wasted.     Additional notes:  Patient is here for her third cycle of Botox.  She has not had any severe headaches in the past 3 months but has been having dull headaches.  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  She did not start amitriptyline as she was afraid of the side effects as it had previously made her extremely groggy.  I have sent in a prescription of nortriptyline 10 mg nightly.  She can also continue Ubrelvy as needed.      -Botox was patient supplied.

## 2021-03-10 ENCOUNTER — OFFICE VISIT (OUTPATIENT)
Dept: OBSTETRICS AND GYNECOLOGY | Facility: CLINIC | Age: 36
End: 2021-03-10

## 2021-03-10 VITALS
WEIGHT: 183 LBS | BODY MASS INDEX: 29.41 KG/M2 | HEIGHT: 66 IN | DIASTOLIC BLOOD PRESSURE: 60 MMHG | SYSTOLIC BLOOD PRESSURE: 120 MMHG

## 2021-03-10 DIAGNOSIS — G43.909 MIGRAINE WITHOUT STATUS MIGRAINOSUS, NOT INTRACTABLE, UNSPECIFIED MIGRAINE TYPE: ICD-10-CM

## 2021-03-10 DIAGNOSIS — N87.0 MILD DYSPLASIA OF CERVIX (CIN I): Primary | ICD-10-CM

## 2021-03-10 DIAGNOSIS — N92.1 MENORRHAGIA WITH IRREGULAR CYCLE: ICD-10-CM

## 2021-03-10 PROCEDURE — 99214 OFFICE O/P EST MOD 30 MIN: CPT | Performed by: OBSTETRICS & GYNECOLOGY

## 2021-03-10 NOTE — PROGRESS NOTES
"Chief Complaint  Follow-up (Repeat pap smear, history of cervical dysplasia. )     History of Present Illness:  Patient is 36 y.o.  who presents to [facility] for follow-up evaluation of her irregular menses as well as history of cervical dysplasia.  Patient had a Pap smear of low-grade squamous intraepithelial lesion in  of last year.  She had cervical biopsy which showed mild dysplasia in August.  Patient had been seen recently with complaints of heavy irregular menses.  Patient was changed to Seasonale.  The patient had a transvaginal ultrasound in November.  Patient has not been seen since that time.  Patient has started her second 3-month pack of Seasonale.  Patient does report midcycle having irregular bleeding.  Patient does report this was menarche for her daughter as well.  Patient has not had any irregular bleeding since that time.  Patient is doing well with the Seasonale.  Patient continues to follow with neurology for her migraines.  Patient is on prophylactic medication.  She is also receiving Botox injections.  She reports no worsening or changes in her migraines since being on the Seasonale.  Patient desires to continue at present.    Physical Examination:  Vital Signs: /60   Ht 167.6 cm (66\")   Wt 83 kg (183 lb)   BMI 29.54 kg/m²     General Appearance: alert, appears stated age, and cooperative  Breasts: Not performed.  Abdomen: no masses, no hepatomegaly, no splenomegaly, soft non-tender, no guarding and no rebound tenderness  Pelvic: Clinical staff was present for exam  External genitalia:  normal appearance of the external genitalia including Bartholin's and [place]'s glands.  :  urethral meatus normal;  Vaginal:  normal pink mucosa without prolapse or lesions.  Cervix:  normal appearance.  Uterus:  normal size, shape and consistency.  Adnexa:  normal bimanual exam of the adnexa.  Pap smear done and specimen sent using Thin-Prep technique    Data " Review:  The following data was reviewed by: Randa Mcghee MD on 03/10/2021:     Labs:    Imaging:  US Non-ob Transvaginal (11/04/2020 16:12)    Medical Records:  None    Assessment and Plan   Problem List Items Addressed This Visit     None      Visit Diagnoses     Mild dysplasia of cervix (MJ I)    -  Primary  Pap smear is obtained today.  Instructions and precautions are given.  Patient is to call for the results.  Plan pending results.    Relevant Orders    Liquid-based Pap Smear, Diagnostic    Menorrhagia with irregular cycle      Patient with improvement in her menorrhagia.  Patient had a transvaginal ultrasound in November.  Her endometrium was unremarkable.  Patient is on her second course of Seasonale.  Instructions and precautions are given.  Plan continue Seasonale at present.    Migraine without status migrainosus, not intractable, unspecified migraine type      I have discussed at length with the patient the risk versus benefits of her oral contraceptives.  Patient is under treatment for her migraines.  She reports no worsening or changes in her migraines since being on the oral contraceptives.  Patient is aware again of the risk.  Patient is to call if worsening and/or changes in her symptoms as discussed.  Patient is to continue to follow with neurology as noted.          Follow Up/Instructions:    Patient was given instructions and counseling regarding her condition or for health maintenance advice. Please see specific information pulled into the AVS if appropriate.     Note: Speech recognition transcription software may have been used to dictate portions of this document.  An attempt at proofreading has been made though minor errors in transcription may still be present.    This note was electronically signed.  Randa Mcghee M.D.

## 2021-03-23 DIAGNOSIS — N87.0 MILD DYSPLASIA OF CERVIX (CIN I): ICD-10-CM

## 2021-04-29 ENCOUNTER — PROCEDURE VISIT (OUTPATIENT)
Dept: NEUROLOGY | Facility: CLINIC | Age: 36
End: 2021-04-29

## 2021-04-29 VITALS
HEART RATE: 100 BPM | HEIGHT: 64 IN | WEIGHT: 183 LBS | TEMPERATURE: 98.2 F | SYSTOLIC BLOOD PRESSURE: 118 MMHG | BODY MASS INDEX: 31.24 KG/M2 | DIASTOLIC BLOOD PRESSURE: 84 MMHG | OXYGEN SATURATION: 98 %

## 2021-04-29 DIAGNOSIS — G43.009 MIGRAINE WITHOUT AURA AND WITHOUT STATUS MIGRAINOSUS, NOT INTRACTABLE: Primary | ICD-10-CM

## 2021-04-29 PROCEDURE — 64615 CHEMODENERV MUSC MIGRAINE: CPT | Performed by: PSYCHIATRY & NEUROLOGY

## 2021-04-29 NOTE — PROGRESS NOTES
Botox Procedure Note-    Current headache frequency: 4-5_ headaches days / month .     The risks and benefits were discussed with the patient. The patient was given the opportunity to ask questions. Informed consent form was signed.     Onabotulinumtoxin A was reconstituted with 0.9% normal saline. All injections sites were prepped with alcohol swab. Approximately 5 units of botox were injected into each of the following sites  as per routine migraine botox injection PREEMPT protocol:  (2 injections), procerus (1 injection), frontalis (4 injections), temporalis (8 injections), occipitalis (6 injections) cervical, upper cervical paraspinals (4 injections), and trapezius (6 injections) for a total of 31 sites.  The patient tolerated the procedure well. There were no immediate complications. The patient will follow up in approximately 12 weeks for her next injection.     Total amount of onabotulinumtoxin A injected was 155 units with 45 units wasted.     Additional notes:  Patient is here for her fourth cycle of Botox.  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  Did not try nortriptyline.  She only had about 4-5 headaches in the past month for which she took Ubrelvy    -Botox was patient supplied.

## 2021-05-03 ENCOUNTER — TELEPHONE (OUTPATIENT)
Dept: OBSTETRICS AND GYNECOLOGY | Facility: CLINIC | Age: 36
End: 2021-05-03

## 2021-05-03 RX ORDER — NORETHINDRONE ACETATE AND ETHINYL ESTRADIOL AND FERROUS FUMARATE 1MG-20(24)
1 KIT ORAL DAILY
Qty: 28 TABLET | Refills: 12 | Status: SHIPPED | OUTPATIENT
Start: 2021-05-03 | End: 2022-05-11 | Stop reason: SDUPTHER

## 2021-05-03 NOTE — TELEPHONE ENCOUNTER
----- Message from Hermila Rigo sent at 5/3/2021 12:07 PM EDT -----  Regarding: Non-Urgent Medical Question  Contact: [phone]  I am still having issues on this pill. A few days ago I started what seems to be a full period, even though I have three weeks of pills left before I was supposed to start.  All of this unplanned bleeding is really getting in the way of life.  While birth control has helped my headaches (which was the sole purpose of taking it since I've had a tubal) it had been a headache otherwise.  Could I try just a monthly pill? Or anything else to make it stop?

## 2021-05-03 NOTE — TELEPHONE ENCOUNTER
Okay to inform patient I sent prescription in for cyclic oral contraceptives.  Patient is to continue this pill pack then start her new birth control pills patient is to follow-up in 2 to 3 months.

## 2021-05-18 RX ORDER — SUMATRIPTAN 6 MG/.5ML
INJECTION, SOLUTION SUBCUTANEOUS
Qty: 1 ML | Refills: 0 | Status: SHIPPED | OUTPATIENT
Start: 2021-05-18 | End: 2022-01-11

## 2021-07-22 ENCOUNTER — PROCEDURE VISIT (OUTPATIENT)
Dept: NEUROLOGY | Facility: CLINIC | Age: 36
End: 2021-07-22

## 2021-07-22 VITALS
OXYGEN SATURATION: 99 % | DIASTOLIC BLOOD PRESSURE: 88 MMHG | BODY MASS INDEX: 30.15 KG/M2 | SYSTOLIC BLOOD PRESSURE: 116 MMHG | WEIGHT: 176.6 LBS | HEART RATE: 87 BPM | HEIGHT: 64 IN

## 2021-07-22 DIAGNOSIS — G43.009 MIGRAINE WITHOUT AURA AND WITHOUT STATUS MIGRAINOSUS, NOT INTRACTABLE: Primary | ICD-10-CM

## 2021-07-22 PROCEDURE — 64615 CHEMODENERV MUSC MIGRAINE: CPT | Performed by: PSYCHIATRY & NEUROLOGY

## 2021-07-22 PROCEDURE — 99212 OFFICE O/P EST SF 10 MIN: CPT | Performed by: PSYCHIATRY & NEUROLOGY

## 2021-07-22 RX ORDER — NORTRIPTYLINE HYDROCHLORIDE 10 MG/1
10 CAPSULE ORAL NIGHTLY
Qty: 30 CAPSULE | Refills: 2 | Status: SHIPPED | OUTPATIENT
Start: 2021-07-22 | End: 2021-10-18

## 2021-07-22 NOTE — PROGRESS NOTES
Subjective:    CC: Hermila Sierra is seen today  for Migraine       Current visit-patient states that since her last visit her headache frequency has gone up.  She is getting about 15 headaches a month.  Has been taking Tylenol and ibuprofen in addition to her Ubrelvy.  Recently she has also had atypical symptoms of seeing shapes like a half moon with obscuration of vision.  On one such occasion she took Ubrelvy and did not have a headache till the next day.  I had sent her subcu Imitrex shot however she did not take it as oral Imitrex caused her to have burning in the back of her head.  She has also cut her hair short in hopes of improving her headaches.    Botox Procedure Note-    Current headache frequency: 15_ headaches days / month .     The risks and benefits were discussed with the patient. The patient was given the opportunity to ask questions. Informed consent form was signed.     Onabotulinumtoxin A was reconstituted with 0.9% normal saline. All injections sites were prepped with alcohol swab. Approximately 5 units of botox were injected into each of the following sites  as per routine migraine botox injection PREEMPT protocol:  (2 injections), procerus (1 injection), frontalis (4 injections), temporalis (8 injections), occipitalis (6 injections) cervical, upper cervical paraspinals (4 injections), and trapezius (6 injections) for a total of 31 sites.  The patient tolerated the procedure well. There were no immediate complications. The patient will follow up in approximately 12 weeks for her next injection.     Total amount of onabotulinumtoxin A injected was 155 units with 45 units wasted.       -Botox was patient supplied.    The following portions of the patient's history were reviewed today and updated as of 07/22/2021  : allergies, current medications, past family history, past medical history, past social history, past surgical history and problem list  These document will be scanned to  patient's chart.      Current Outpatient Medications:   •  Norethin Ace-Eth Estrad-FE 1-20 MG-MCG(24) chewable tablet, Chew 1 tablet Daily., Disp: 28 tablet, Rfl: 12  •  OnabotulinumtoxinA 200 units reconstituted solution, Inject 200 units IM into head neck shoulders every 12 weeks per FDA protocol. Dx:G43.709, Disp: 1 each, Rfl: 3  •  SUMAtriptan (IMITREX) 6 MG/0.5ML injection, Inject prescribed dose at onset of headache. May repeat dose one time in 1 hour(s) if headache not relieved. ., Disp: 1 mL, Rfl: 0  •  ubrogepant (ubrogepant) 100 MG tablet, Take 1 tablet by mouth As Needed (for headache). May repeat once in 2 hours.  Do not take over 2 tabs a day or 8 tabs a month, Disp: 8 tablet, Rfl: 3  •  nortriptyline (Pamelor) 10 MG capsule, Take 1 capsule by mouth Every Night., Disp: 30 capsule, Rfl: 2    Current Facility-Administered Medications:   •  OnabotulinumtoxinA 200 Units, 200 Units, Intramuscular, Q3 Months, Sravani Mccauley MD, 200 Units at 10/22/20 1630   Past Medical History:   Diagnosis Date   • Abnormal Pap smear of cervix    • Abnormal Pap smear of cervix 2020    LSIL   • Cervical dysplasia    • HPV (human papilloma virus) infection    • Migraine       Past Surgical History:   Procedure Laterality Date   • DILATATION AND CURETTAGE     • TUBAL ABDOMINAL LIGATION     • WISDOM TOOTH EXTRACTION        Family History   Problem Relation Age of Onset   • Hypertension Father    • Heart disease Father    • Diabetes Father    • Diabetes Brother    • Diabetes Mother    • Breast cancer Paternal Grandmother    • Breast cancer Paternal Aunt       Social History     Socioeconomic History   • Marital status:      Spouse name: Not on file   • Number of children: Not on file   • Years of education: Not on file   • Highest education level: Not on file   Tobacco Use   • Smoking status: Former Smoker     Years: 4.00     Quit date:      Years since quittin.5   • Smokeless tobacco: Never Used  "  Vaping Use   • Vaping Use: Never used   Substance and Sexual Activity   • Alcohol use: No   • Drug use: No   • Sexual activity: Yes     Partners: Male     Birth control/protection: OCP, Surgical     Review of Systems    Objective:    /88   Pulse 87   Ht 162.6 cm (64.02\")   Wt 80.1 kg (176 lb 9.6 oz)   SpO2 99%   BMI 30.30 kg/m²     Neurology Exam:    General apperance: NAD.     Mental status: Alert, awake and oriented to time place and person.    Recent and Remote memory: Intact.    Attention span and Concentration: Normal.     Language and Speech: Intact- No dysarthria.    Fluency, Naming , Repitition and Comprehension:  Intact    Cranial Nerves:   CN II: Visual fields are full. Intact. Fundi - Normal, No papillederma, Pupils - OTM  CN III, IV and VI: Extraocular movements are intact. Normal saccades.   CN V: Facial sensation is intact.   CN VII: Muscles of facial expression reveal no asymmetry. Intact.   CN VIII: Hearing is intact. Whispered voice intact.   CN IX and X: Palate elevates symmetrically. Intact  CN XI: Shoulder shrug is intact.   CN XII: Tongue is midline without evidence of atrophy or fasciculation.     Ophthalmoscopic exam of optic disc-normal    Motor:  Right UE muscle strength 5/5. Normal tone.     Left UE muscle strength 5/5. Normal tone.      Right LE muscle strength5/5. Normal tone.     Left LE muscle strength 5/5. Normal tone.      Sensory: Normal light touch, vibration and pinprick sensation bilaterally.    DTRs: 2+ bilaterally in upper and lower extremities.    Babinski: Negative bilaterally.    Co-ordination: Normal finger-to-nose, heel to shin B/L.    Rhomberg: Negative.    Gait: Normal.    Cardiovascular: Regular rate and rhythm without murmur, gallop or rub.    Assessment and Plan:  1. Migraine without aura and without status migrainosus, not intractable  I explained to her that she may be having ocular migraines.    I will  send in a prescription of nortriptyline 10 mg " nightly  Will continue Botox  She can continue Ubrelvy as needed.    Return in about 3 months (around 10/22/2021) for Botox.         Sravani Mccauley MD

## 2021-08-05 RX ORDER — METHYLPREDNISOLONE 4 MG/1
TABLET ORAL
Qty: 1 EACH | Refills: 0 | Status: SHIPPED | OUTPATIENT
Start: 2021-08-05 | End: 2021-12-01

## 2021-10-14 ENCOUNTER — TELEPHONE (OUTPATIENT)
Dept: NEUROLOGY | Facility: OTHER | Age: 36
End: 2021-10-14

## 2021-10-14 NOTE — TELEPHONE ENCOUNTER
[facility] pharmacy called to schedule delivery for botox. I was able to transfer call to [facility] in office.

## 2021-10-18 ENCOUNTER — PROCEDURE VISIT (OUTPATIENT)
Dept: NEUROLOGY | Facility: CLINIC | Age: 36
End: 2021-10-18

## 2021-10-18 VITALS — HEIGHT: 64 IN | WEIGHT: 176 LBS | BODY MASS INDEX: 30.05 KG/M2

## 2021-10-18 DIAGNOSIS — G43.009 MIGRAINE WITHOUT AURA AND WITHOUT STATUS MIGRAINOSUS, NOT INTRACTABLE: Primary | ICD-10-CM

## 2021-10-18 PROCEDURE — 64615 CHEMODENERV MUSC MIGRAINE: CPT | Performed by: PSYCHIATRY & NEUROLOGY

## 2021-10-18 NOTE — PROGRESS NOTES
Botox Procedure Note-    Current headache frequency: 6-15_ headaches days / month .     The risks and benefits were discussed with the patient. The patient was given the opportunity to ask questions. Informed consent form was signed.     Onabotulinumtoxin A was reconstituted with 0.9% normal saline. All injections sites were prepped with alcohol swab. Approximately 5 units of botox were injected into each of the following sites  as per routine migraine botox injection PREEMPT protocol:  (2 injections), procerus (1 injection), frontalis (4 injections), temporalis (8 injections), occipitalis (6 injections) cervical, upper cervical paraspinals (4 injections), and trapezius (6 injections) for a total of 31 sites.  The patient tolerated the procedure well. There were no immediate complications. The patient will follow up in approximately 12 weeks for her next injection.     Total amount of onabotulinumtoxin A injected was 155 units with 45 units wasted.     Additional notes-patient states that her headache frequency became worse after her last Botox cycle and she was having near daily headaches despite starting nortriptyline 10 mg.  Over time her headaches started to ease up however nortriptyline caused her to have worsening anxiety and she stopped taking it 2 weeks ago.  She seems to be calmer since stopping the medication.  Has had about 2-3 dull headaches in the past 2 weeks mainly in the front of her head on both sides for which she has taken Tylenol/ibuprofen.  Takes Ubrelvy for her severe ones.  If her headaches continue then I may start her on an anti-CGRP injectable such as Aimovig.    -Botox was patient supplied.

## 2021-11-08 DIAGNOSIS — G43.009 MIGRAINE WITHOUT AURA AND WITHOUT STATUS MIGRAINOSUS, NOT INTRACTABLE: Primary | ICD-10-CM

## 2021-12-01 ENCOUNTER — TELEMEDICINE (OUTPATIENT)
Dept: NEUROLOGY | Facility: CLINIC | Age: 36
End: 2021-12-01

## 2021-12-01 DIAGNOSIS — G43.009 MIGRAINE WITHOUT AURA AND WITHOUT STATUS MIGRAINOSUS, NOT INTRACTABLE: Primary | ICD-10-CM

## 2021-12-01 DIAGNOSIS — G44.85 PRIMARY STABBING HEADACHE: ICD-10-CM

## 2021-12-01 PROCEDURE — 99213 OFFICE O/P EST LOW 20 MIN: CPT | Performed by: PSYCHIATRY & NEUROLOGY

## 2021-12-01 RX ORDER — INDOMETHACIN 50 MG/1
50 CAPSULE ORAL AS NEEDED
Qty: 90 CAPSULE | Refills: 1 | Status: SHIPPED | OUTPATIENT
Start: 2021-12-01

## 2021-12-01 NOTE — PROGRESS NOTES
Subjective:    CC: Hermila Sierra is seen today via video visit  headaches    HPI:  Current visit-patient states that since her last visit in October she started to have severe almost daily headaches in her left temporal region, stabbing in nature lasting for a few seconds occasionally several times a day radiating to her jaw.  After that I had ordered her MRI brain for her that is still pending.  She saw her dentist who did not think she had a dental infection and also saw an ENT doctor who gave her antibiotics and steroids for presumable ear infection that did not help either.  After that patient diagnosed herself with TMJ and did exercises to help with it that has helped as she has been pain-free for the past few days.  She has not had too many migraine headaches since her last visit.  Continues to take Ubrelvy as needed.    Last visit-Botox given     Last visit-patient states that since her last Botox 6 weeks ago her headaches have improved significantly and she has only had 2 headaches in the past month.  These were relieved by Ubrelvy 100 mg.  She is currently under a lot of stress because of working at home and home schooling her 3 kids but the headaches remain well controlled.    Initial gefvr-78-upyg-old female with no significant past medical history presents with headaches.  As per patient she has had headaches since she was a teenager however they worsened while she was pregnant with her third child who is now 10.  In November 2017 patient had a severe thunderclap headache in the back of her head.  She had a CT head at the time with did not show any hemorrhage.  After that she started to have frequent headaches and was tried on several different medications including propranolol that made her blood pressure drop,  amitriptyline that made her dull, Topamax that caused dizziness, Trokendi that was not approved by insurance and Imitrex that made her very sleepy and caused burning in the back of her head.  In  October 2018 she started to get Botox from [facility].  That helped with her headaches tremendously and she now has 1 migraine headache every few months for which she takes over-the-counter medications that may or may not help.  However she continues to have a few dull headaches each month around her menstrual cycle.  These started after she went off birth control.  Has had a dull headache for the past 3 days now which was not relieved by taking Tylenol or ibuprofen.  She also had a MRI brain in October 2019 that did not show any acute intracranial abnormalities or chronic ischemic changes.    The following portions of the patient's history were reviewed today and updated as of 05/08/2020  : allergies, current medications, past family history, past medical history, past social history, past surgical history and problem list  These document will be scanned to patient's chart.      Current Outpatient Medications:   •  indomethacin (INDOCIN) 50 MG capsule, Take 1 capsule by mouth As Needed for Mild Pain . Can take up to 3 capsules a day., Disp: 90 capsule, Rfl: 1  •  Norethin Ace-Eth Estrad-FE 1-20 MG-MCG(24) chewable tablet, Chew 1 tablet Daily., Disp: 28 tablet, Rfl: 12  •  OnabotulinumtoxinA 200 units reconstituted solution, Inject 200 units IM into head neck shoulders every 12 weeks per FDA protocol. Dx:G43.709, Disp: 1 each, Rfl: 3  •  SUMAtriptan (IMITREX) 6 MG/0.5ML injection, Inject prescribed dose at onset of headache. May repeat dose one time in 1 hour(s) if headache not relieved. ., Disp: 1 mL, Rfl: 0  •  ubrogepant (ubrogepant) 100 MG tablet, Take 1 tablet by mouth As Needed (for headache). May repeat once in 2 hours.  Do not take over 2 tabs a day or 8 tabs a month, Disp: 8 tablet, Rfl: 3    Current Facility-Administered Medications:   •  OnabotulinumtoxinA 200 Units, 200 Units, Intramuscular, Q3 Months, Sravani Mccauley MD, 200 Units at 10/18/21 7076   Past Medical History:   Diagnosis Date   • Abnormal  Pap smear of cervix    • Abnormal Pap smear of cervix 2020    LSIL   • Cervical dysplasia    • HPV (human papilloma virus) infection    • Migraine       Past Surgical History:   Procedure Laterality Date   • DILATATION AND CURETTAGE     • TUBAL ABDOMINAL LIGATION     • WISDOM TOOTH EXTRACTION        Family History   Problem Relation Age of Onset   • Hypertension Father    • Heart disease Father    • Diabetes Father    • Diabetes Brother    • Diabetes Mother    • Breast cancer Paternal Grandmother    • Breast cancer Paternal Aunt       Social History     Socioeconomic History   • Marital status:    Tobacco Use   • Smoking status: Former Smoker     Years: 4.00     Quit date:      Years since quittin.9   • Smokeless tobacco: Never Used   Vaping Use   • Vaping Use: Never used   Substance and Sexual Activity   • Alcohol use: No   • Drug use: No   • Sexual activity: Yes     Partners: Male     Birth control/protection: OCP, Surgical     Review of Systems   Neurological: Positive for headache.   All other systems reviewed and are negative.      Objective:    There were no vitals taken for this visit.    Neurology Exam:    General apperance: NAD.     Mental status: Alert, awake and oriented to time place and person.    Recent and Remote memory: Intact.    Attention span and Concentration: Normal.     Language and Speech: Intact- No dysarthria.    Fluency, Naming , Repitition and Comprehension:  Intact    Cranial Nerves:   CN II: Visual fields are full. Intact. Fundi - Normal, No papillederma, Pupils - TOM  CN III, IV and VI: Extraocular movements are intact. Normal saccades.   CN V: Facial sensation is intact.   CN VII: Muscles of facial expression reveal no asymmetry. Intact.   CN VIII: Hearing is intact. Whispered voice intact.   CN IX and X: Palate elevates symmetrically. Intact  CN XI: Shoulder shrug is intact.   CN XII: Tongue is midline without evidence of atrophy or fasciculation.        Motor:  Normal    Gait: Normal.      Assessment and Plan:  1. Migraine without aura and without status migrainosus, not intractable  In the past she has tried amitriptyline, nortriptyline and propranolol.  Was afraid to take Topamax because of side effects.  Patient gets Botox for her migraine headaches  -She can continue Ubrelvy 100 mg as needed  -For her hormonal headaches she is currently on a low-dose oral contraceptive pill along with iron supplements    2.  Stabbing headaches  Could be due to TMJ  I will prescribe her indomethacin 50 mg to be taken as needed  MRI brain is pending      Follow-up in 6 weeks for Botox      Sravani Mccauley MD

## 2021-12-10 ENCOUNTER — APPOINTMENT (OUTPATIENT)
Dept: MRI IMAGING | Facility: HOSPITAL | Age: 36
End: 2021-12-10

## 2022-01-11 ENCOUNTER — PROCEDURE VISIT (OUTPATIENT)
Dept: NEUROLOGY | Facility: CLINIC | Age: 37
End: 2022-01-11

## 2022-01-11 VITALS
HEART RATE: 82 BPM | DIASTOLIC BLOOD PRESSURE: 70 MMHG | OXYGEN SATURATION: 97 % | HEIGHT: 64 IN | SYSTOLIC BLOOD PRESSURE: 122 MMHG | BODY MASS INDEX: 31.24 KG/M2 | WEIGHT: 183 LBS

## 2022-01-11 DIAGNOSIS — G43.009 MIGRAINE WITHOUT AURA AND WITHOUT STATUS MIGRAINOSUS, NOT INTRACTABLE: Primary | ICD-10-CM

## 2022-01-11 PROCEDURE — 64615 CHEMODENERV MUSC MIGRAINE: CPT | Performed by: PSYCHIATRY & NEUROLOGY

## 2022-01-11 NOTE — PROGRESS NOTES
Botox Procedure Note-    Current headache frequency: 3-5_ headaches days / month .     The risks and benefits were discussed with the patient. The patient was given the opportunity to ask questions. Informed consent form was signed.     Onabotulinumtoxin A was reconstituted with 0.9% normal saline. All injections sites were prepped with alcohol swab. Approximately 5 units of botox were injected into each of the following sites  as per routine migraine botox injection PREEMPT protocol:  (2 injections), procerus (1 injection), frontalis (4 injections), temporalis (8 injections), occipitalis (6 injections) cervical, upper cervical paraspinals (4 injections), and trapezius (6 injections) for a total of 31 sites.  The patient tolerated the procedure well. There were no immediate complications. The patient will follow up in approximately 12 weeks for her next injection.     Total amount of onabotulinumtoxin A injected was 155 units with 45 units wasted.     Additional notes- patient states her stabbing headaches had improved after she started doing exercises for TMJ however she saw her chiropractor who did a deep tissue massage triggered the pain again in her jaw.  She has yet to try indomethacin that I had prescribed.  Has only had a few severe headaches in the past 3 months.  Continues to take Ubrelvy as needed.  Her MRI brain is scheduled for tomorrow.    -Botox was patient supplied.

## 2022-01-12 ENCOUNTER — HOSPITAL ENCOUNTER (OUTPATIENT)
Dept: MRI IMAGING | Facility: HOSPITAL | Age: 37
End: 2022-01-12

## 2022-02-03 ENCOUNTER — HOSPITAL ENCOUNTER (OUTPATIENT)
Dept: MRI IMAGING | Facility: HOSPITAL | Age: 37
End: 2022-02-03

## 2022-02-18 ENCOUNTER — HOSPITAL ENCOUNTER (OUTPATIENT)
Dept: MRI IMAGING | Facility: HOSPITAL | Age: 37
End: 2022-02-18

## 2022-03-17 ENCOUNTER — HOSPITAL ENCOUNTER (OUTPATIENT)
Dept: MRI IMAGING | Facility: HOSPITAL | Age: 37
Discharge: HOME | End: 2022-03-17
Admitting: PSYCHIATRY & NEUROLOGY

## 2022-03-17 DIAGNOSIS — G43.009 MIGRAINE WITHOUT AURA AND WITHOUT STATUS MIGRAINOSUS, NOT INTRACTABLE: ICD-10-CM

## 2022-03-17 PROCEDURE — 70553 MRI BRAIN STEM W/O & W/DYE: CPT

## 2022-03-17 PROCEDURE — A9577 INJ MULTIHANCE: HCPCS | Performed by: PSYCHIATRY & NEUROLOGY

## 2022-03-17 PROCEDURE — 0 GADOBENATE DIMEGLUMINE 529 MG/ML SOLUTION: Performed by: PSYCHIATRY & NEUROLOGY

## 2022-03-17 RX ADMIN — GADOBENATE DIMEGLUMINE 15 ML: 529 INJECTION, SOLUTION INTRAVENOUS at 14:49

## 2022-04-04 ENCOUNTER — TELEPHONE (OUTPATIENT)
Dept: NEUROLOGY | Facility: CLINIC | Age: 37
End: 2022-04-04

## 2022-04-04 NOTE — TELEPHONE ENCOUNTER
Provider: DR. GAY   Caller: DELTA WITH [facility]  Relationship to Patient: N/A  Pharmacy: N/A  Phone Number: [phone]  Reason for Call: NEEDS TO SCHEDULE DELIVERY OF BOTOX    ATTEMPTED TO WARM TX, NO ANSWER

## 2022-04-04 NOTE — TELEPHONE ENCOUNTER
Hi,    I called and they are showing a PA but it is still coming up as denied.     Do you care to follow up?

## 2022-04-11 ENCOUNTER — PROCEDURE VISIT (OUTPATIENT)
Dept: NEUROLOGY | Facility: CLINIC | Age: 37
End: 2022-04-11

## 2022-04-11 VITALS
HEART RATE: 85 BPM | WEIGHT: 183 LBS | SYSTOLIC BLOOD PRESSURE: 120 MMHG | DIASTOLIC BLOOD PRESSURE: 80 MMHG | BODY MASS INDEX: 31.24 KG/M2 | HEIGHT: 64 IN | OXYGEN SATURATION: 99 %

## 2022-04-11 DIAGNOSIS — G43.009 MIGRAINE WITHOUT AURA AND WITHOUT STATUS MIGRAINOSUS, NOT INTRACTABLE: Primary | ICD-10-CM

## 2022-04-11 PROCEDURE — 64615 CHEMODENERV MUSC MIGRAINE: CPT | Performed by: PSYCHIATRY & NEUROLOGY

## 2022-04-11 RX ORDER — CLINDAMYCIN HYDROCHLORIDE 300 MG/1
CAPSULE ORAL
COMMUNITY
Start: 2022-04-07 | End: 2022-10-14

## 2022-04-11 NOTE — PROGRESS NOTES
Botox Procedure Note-    Current headache frequency: 1-2_ headaches days / month .     The risks and benefits were discussed with the patient. The patient was given the opportunity to ask questions. Informed consent form was signed.     Onabotulinumtoxin A was reconstituted with 0.9% normal saline. All injections sites were prepped with alcohol swab. Approximately 5 units of botox were injected into each of the following sites  as per routine migraine botox injection PREEMPT protocol:  (2 injections), procerus (1 injection), frontalis (3 injections), temporalis (8 injections), occipitalis (6 injections) cervical, upper cervical paraspinals (4 injections), and trapezius (6 injections) for a total of 31 sites.  The patient tolerated the procedure well. There were no immediate complications. The patient will follow up in approximately 12 weeks for her next injection.     Total amount of onabotulinumtoxin A injected was 155 units with 45 units wasted.     Additional notes- patient states her stabbing headaches had improved significantly.  In fact she has rarely taken Ubrelvy.  Took indomethacin 2-3 times for the headaches which helped.  Her MRI brain with and without contrast that she had did not show any acute intracranial abnormalities or chronic ischemic changes.  I did not give the patient Botox in the right frontalis today as she has a high eyebrow arch which she feels could be due to the Botox.  -Botox was patient supplied.

## 2022-04-18 RX ORDER — NORETHINDRONE ACETATE AND ETHINYL ESTRADIOL AND FERROUS FUMARATE 1MG-20(24)
1 KIT ORAL DAILY
Qty: 28 TABLET | Refills: 12 | OUTPATIENT
Start: 2022-04-18

## 2022-04-26 ENCOUNTER — TELEPHONE (OUTPATIENT)
Dept: NEUROLOGY | Facility: CLINIC | Age: 37
End: 2022-04-26

## 2022-04-26 NOTE — TELEPHONE ENCOUNTER
Provider:   Caller: MARILEE  Relationship to Patient: SELF  Phone Number: [phone]      Reason for Call: PT STATES THIS IS THE THIRD ROUND OF BOTOX THAT PT HAS EXPERIENCED DROOPING, SHE STATES THAT CURRENTLY HER LEFT EYE IS EXTREMELY DROOPED AS WELL AS IT IS EFFECTING HER VISION, (SHE EXPLAINS SHE CAN SEE HER EYEBROW WITHOUT TRYING TO DO SO)    When was the patient last seen: 4-11-22    When did it start: NOTICED IT WAS EXTREME ABOUT A WEEK AGO    Where is it located: LEFT EYE

## 2022-04-26 NOTE — TELEPHONE ENCOUNTER
Tell the patient hopefully this should subside as time goes by because the effect of Botox is temporary and permanent.  Also let her know that we can skip the forehead injections entirely then especially on the left and just give the rest of the injections.

## 2022-04-26 NOTE — TELEPHONE ENCOUNTER
Informed patient and she states that it does not go away for 6 months and 2 weeks. She is thinking about going to a medspa to get the cosmetic issue fixed. It has progressively got worse.

## 2022-05-11 ENCOUNTER — OFFICE VISIT (OUTPATIENT)
Dept: OBSTETRICS AND GYNECOLOGY | Facility: CLINIC | Age: 37
End: 2022-05-11

## 2022-05-11 VITALS
WEIGHT: 184 LBS | HEIGHT: 64 IN | SYSTOLIC BLOOD PRESSURE: 120 MMHG | BODY MASS INDEX: 31.41 KG/M2 | DIASTOLIC BLOOD PRESSURE: 70 MMHG

## 2022-05-11 DIAGNOSIS — G43.909 MIGRAINE WITHOUT STATUS MIGRAINOSUS, NOT INTRACTABLE, UNSPECIFIED MIGRAINE TYPE: ICD-10-CM

## 2022-05-11 DIAGNOSIS — R79.0 LOW FERRITIN LEVEL: ICD-10-CM

## 2022-05-11 DIAGNOSIS — N87.0 MILD DYSPLASIA OF CERVIX (CIN I): ICD-10-CM

## 2022-05-11 DIAGNOSIS — N92.0 MENORRHAGIA WITH REGULAR CYCLE: ICD-10-CM

## 2022-05-11 DIAGNOSIS — Z80.3 FAMILY HISTORY OF BREAST CANCER: ICD-10-CM

## 2022-05-11 DIAGNOSIS — Z01.419 ENCOUNTER FOR GYNECOLOGICAL EXAMINATION (GENERAL) (ROUTINE) WITHOUT ABNORMAL FINDINGS: Primary | ICD-10-CM

## 2022-05-11 DIAGNOSIS — Z12.39 ENCOUNTER FOR BREAST CANCER SCREENING OTHER THAN MAMMOGRAM: ICD-10-CM

## 2022-05-11 PROCEDURE — 99395 PREV VISIT EST AGE 18-39: CPT | Performed by: OBSTETRICS & GYNECOLOGY

## 2022-05-11 RX ORDER — NORETHINDRONE ACETATE AND ETHINYL ESTRADIOL AND FERROUS FUMARATE 1MG-20(24)
1 KIT ORAL DAILY
Qty: 84 TABLET | Refills: 4 | Status: SHIPPED | OUTPATIENT
Start: 2022-05-11

## 2022-05-12 NOTE — PROGRESS NOTES
Chief Complaint  Gynecologic Exam     History of Present Illness:  Patient is 37 y.o.  who presents to [facility] here for her annual examination as well as follow-up of her menorrhagia and dysmenorrhea.  Patient has continued on her low-dose cyclic oral contraceptives.  She does report that her menstrual cycles are regular in nature.  Patient is having them monthly.  Patient does continue to have heavy flow.  She denies any intermenstrual bleeding.  Patient does have cramping associated with her menstrual cycles.  Patient is tolerating her oral contraceptives without any problems.  Patient does have family history of breast cancer.  She reports having genetic screening in  which was negative.  The patient reports no worsening or changes in her migraines with her current oral contraceptive.  Patient continues to see Dr. Pinzon.  Patient does report having recent labs.    History  Past Medical History:   Diagnosis Date   • Abnormal Pap smear of cervix    • Abnormal Pap smear of cervix 2020    LSIL   • Cervical dysplasia    • HPV (human papilloma virus) infection    • Migraine      Current Outpatient Medications on File Prior to Visit   Medication Sig Dispense Refill   • clindamycin (CLEOCIN) 300 MG capsule TAKE 1 CAPSULE EVERY 8 FOR 7 DAYS.     • indomethacin (INDOCIN) 50 MG capsule Take 1 capsule by mouth As Needed for Mild Pain . Can take up to 3 capsules a day. 90 capsule 1   • OnabotulinumtoxinA 200 units reconstituted solution Inject 200 units IM into head neck shoulders every 12 weeks per FDA protocol. Dx:G43.709 ship to [address] 1 each 3   • ubrogepant (ubrogepant) 100 MG tablet Take 1 tablet by mouth As Needed (for headache). May repeat once in 2 hours.  Do not take over 2 tabs a day or 8 tabs a month 8 tablet 3     Current Facility-Administered Medications on File Prior to Visit   Medication Dose Route Frequency Provider Last Rate Last Admin   •  "OnabotulinumtoxinA 200 Units  200 Units Intramuscular Q3 Months Sravani Mccauley MD   200 Units at 22 1008     Allergies   Allergen Reactions   • Sulfa Antibiotics Unknown (See Comments)           Past Surgical History:   Procedure Laterality Date   • DILATATION AND CURETTAGE     • TUBAL ABDOMINAL LIGATION     • WISDOM TOOTH EXTRACTION       Family History   Problem Relation Age of Onset   • Hypertension Father    • Heart disease Father    • Diabetes Father    • Diabetes Brother    • Diabetes Mother    • Breast cancer Paternal Grandmother    • Breast cancer Paternal Aunt      Social History     Socioeconomic History   • Marital status:    Tobacco Use   • Smoking status: Former Smoker     Years: 4.00     Quit date:      Years since quittin.3   • Smokeless tobacco: Never Used   Vaping Use   • Vaping Use: Never used   Substance and Sexual Activity   • Alcohol use: No   • Drug use: No   • Sexual activity: Yes     Partners: Male     Birth control/protection: OCP, Surgical       Physical Examination:  Vital Signs: /70   Ht 162.6 cm (64\")   Wt 83.5 kg (184 lb)   BMI 31.58 kg/m²     General Appearance: alert, appears stated age, and cooperative  Breasts: Examined in supine position  Symmetric without masses or skin dimpling  Nipples normal without inversion, lesions or discharge  There are no palpable axillary nodes  Abdomen: no masses, no hepatomegaly, no splenomegaly, soft non-tender, no guarding and no rebound tenderness  Pelvic: Clinical staff was present for exam  External genitalia:  normal appearance of the external genitalia including Bartholin's and [place]'s glands.  :  urethral meatus normal;  Vaginal:  normal pink mucosa without prolapse or lesions.  Cervix:  normal appearance.  Uterus:  normal size, shape and consistency.  Adnexa:  normal bimanual exam of the adnexa.  Pap smear done and specimen sent using Thin-Prep technique    Data Review:  The following data was reviewed by: " Randa Mcghee MD on 05/11/2022:     Labs:    Imaging:    Medical Records:  None    Assessment and Plan   Problem List Items Addressed This Visit    None     Visit Diagnoses     Encounter for gynecological examination (general) (routine) without abnormal findings    -  Primary  Pap was done today.  If she does not receive the results of the Pap within 2 weeks  time, she was instructed to call to find out the results.  I explained to Hermila that the recommendations for Pap smear interval in a low risk patient has lengthened to 3 years time if cytology alone normal or  5 years time if both cytology and HPV testing were normal.  I encouraged her to be seen yearly for a full physical exam including breast and pelvic exam even during the off years when PAP's will not be performed.    Relevant Orders    LIQUID-BASED PAP SMEAR, [facility] ([facility])    Mammo Screening Digital Tomosynthesis Bilateral With CAD    Mild dysplasia of cervix (MJ I)      Pap smear is obtained today as noted.  Patient is to call for the results.    Relevant Orders    LIQUID-BASED PAP SMEAR, [facility] ([facility])    Encounter for breast cancer screening other than mammogram      It is recommended per ACOG, for women at average risk to start annual mammogram screening at the age of 40 until the age of 75 and an individualized decision be made for women after age 75.  She was encouraged to continue getting yearly mammograms.  She should report any palpable breast lump(s) or skin changes regardless of mammographic findings.  I explained to Hermila that notification regarding her mammogram results will come from the center performing the study.  Our office will not be routinely calling with mammogram results.  It is her responsibility to make sure that the results from the mammogram are communicated to her by the breast center.  If she has any questions about the results, she is welcome to call our office anytime.  The patient reports she will schedule  her mammogram.    Hermila was counseled regarding having clinical breast exams and breast self-awareness.  Women aged 29-39 years of age should have clinical breast exams every 1-3 years and yearly aged 40 and older.  The patient was counseled regarding breast self-awareness focusing on having a sense of what is normal for her breasts so that she can tell if there are changes.  Even small changes should be reported to provider.    Family history of breast cancer      Patient with family history of breast cancer as noted.  Order is given for mammogram as discussed.  Plan pending results.    Menorrhagia with regular cycle      Patient does report improvement in her menorrhagia.  Patient desires to continue her current low-dose oral contraceptive.  Prescription is given as noted.  Instructions and precautions have been given.    Migraine without status migrainosus, not intractable, unspecified migraine type      Patient reports no worsening of her migraines with her low-dose oral contraceptive.  Prescription is given as noted.  Instructions and precautions have been given.    Low ferritin level      Patient with low ferritin levels.  Patient with menorrhagia as well.  Patient does report however improvement in her symptoms with her current low-dose oral contraceptives.  Patient has had labs with her primary care provider.          Follow Up/Instructions:  Follow up as noted.  Patient was given instructions and counseling regarding her condition or for health maintenance advice. Please see specific information pulled into the AVS if appropriate.     Note: Speech recognition transcription software may have been used to dictate portions of this document.  An attempt at proofreading has been made though minor errors in transcription may still be present.    This note was electronically signed.  Randa Mcghee M.D.

## 2022-05-17 LAB — REF LAB TEST METHOD: NORMAL

## 2022-07-05 ENCOUNTER — PROCEDURE VISIT (OUTPATIENT)
Dept: NEUROLOGY | Facility: CLINIC | Age: 37
End: 2022-07-05

## 2022-07-05 DIAGNOSIS — G43.009 MIGRAINE WITHOUT AURA AND WITHOUT STATUS MIGRAINOSUS, NOT INTRACTABLE: Primary | ICD-10-CM

## 2022-07-05 PROCEDURE — 64615 CHEMODENERV MUSC MIGRAINE: CPT | Performed by: PSYCHIATRY & NEUROLOGY

## 2022-07-05 NOTE — PROGRESS NOTES
Botox Procedure Note-    Current headache frequency: 1-2_ headaches days / month .     The risks and benefits were discussed with the patient. The patient was given the opportunity to ask questions. Informed consent form was signed.     Onabotulinumtoxin A was reconstituted with 0.9% normal saline. All injections sites were prepped with alcohol swab. Approximately 5 units of botox were injected into each of the following sites  as per routine migraine botox injection PREEMPT protocol:  (2 injections), procerus (1 injection), frontalis (2 injections), temporalis (8 injections), occipitalis (6 injections) cervical, upper cervical paraspinals (4 injections), and trapezius (6 injections) for a total of 28 sites.  The patient tolerated the procedure well. There were no immediate complications. The patient will follow up in approximately 12 weeks for her next injection.     Total amount of onabotulinumtoxin A injected was 155 units with 45 units wasted.     Additional notes- at the last visit I had not given the patient Botox shots on the right side of her forehead (in the procerus/frontalis muscle) because of her high arched eyebrow however a few weeks after the shots she called me stating that her left eyebrow was droopy even obstructing her vision.  This has gradually resolved and it seems to be normal now.  However I did not give her the shots on the left side today (both procerus and frontalis) to prevent it from further drooping.  She can continue to take Ubrelvy as needed    -Botox was patient supplied.

## 2022-07-21 ENCOUNTER — HOSPITAL ENCOUNTER (OUTPATIENT)
Dept: MAMMOGRAPHY | Facility: HOSPITAL | Age: 37
Discharge: HOME | End: 2022-07-21
Admitting: OBSTETRICS & GYNECOLOGY

## 2022-07-21 DIAGNOSIS — Z12.39 ENCOUNTER FOR BREAST CANCER SCREENING OTHER THAN MAMMOGRAM: ICD-10-CM

## 2022-07-21 DIAGNOSIS — N92.0 MENORRHAGIA WITH REGULAR CYCLE: ICD-10-CM

## 2022-07-21 DIAGNOSIS — Z80.3 FAMILY HISTORY OF BREAST CANCER: ICD-10-CM

## 2022-07-21 DIAGNOSIS — G43.909 MIGRAINE WITHOUT STATUS MIGRAINOSUS, NOT INTRACTABLE, UNSPECIFIED MIGRAINE TYPE: ICD-10-CM

## 2022-07-21 DIAGNOSIS — R79.0 LOW FERRITIN LEVEL: ICD-10-CM

## 2022-07-21 PROCEDURE — 77067 SCR MAMMO BI INCL CAD: CPT

## 2022-07-21 PROCEDURE — 77063 BREAST TOMOSYNTHESIS BI: CPT

## 2022-10-14 ENCOUNTER — PROCEDURE VISIT (OUTPATIENT)
Dept: NEUROLOGY | Facility: CLINIC | Age: 37
End: 2022-10-14

## 2022-10-14 VITALS
BODY MASS INDEX: 31.58 KG/M2 | SYSTOLIC BLOOD PRESSURE: 124 MMHG | WEIGHT: 184 LBS | OXYGEN SATURATION: 98 % | DIASTOLIC BLOOD PRESSURE: 62 MMHG | HEART RATE: 90 BPM

## 2022-10-14 DIAGNOSIS — G43.109 MIGRAINE WITH AURA AND WITHOUT STATUS MIGRAINOSUS, NOT INTRACTABLE: Primary | ICD-10-CM

## 2022-10-14 PROCEDURE — 64615 CHEMODENERV MUSC MIGRAINE: CPT | Performed by: PSYCHIATRY & NEUROLOGY

## 2022-10-14 NOTE — PROGRESS NOTES
Botox Procedure Note-    Current headache frequency: 1-2_ headaches days / month .     The risks and benefits were discussed with the patient. The patient was given the opportunity to ask questions. Informed consent form was signed.     Onabotulinumtoxin A was reconstituted with 0.9% normal saline. All injections sites were prepped with alcohol swab. Approximately 5 units of botox were injected into each of the following sites  as per routine migraine botox injection PREEMPT protocol:  procerus (1 injection), frontalis (2 injections), temporalis (8 injections), occipitalis (6 injections) cervical, upper cervical paraspinals (4 injections), and trapezius (6 injections) for a total of 28 sites.  The patient tolerated the procedure well. There were no immediate complications. The patient will follow up in approximately 12 weeks for her next injection.     Total amount of onabotulinumtoxin A injected was 145 units with 45 units wasted.     Additional notes- her headaches continue to be well controlled.  I did not give her the 2 injections in her  as she is afraid she will have the high arched eyebrows again.  She can continue to take Ubrelvy as needed.  She is planning to go off of her birth control pills    -Botox was patient supplied.

## 2023-01-10 ENCOUNTER — PROCEDURE VISIT (OUTPATIENT)
Dept: NEUROLOGY | Facility: CLINIC | Age: 38
End: 2023-01-10
Payer: MEDICAID

## 2023-01-10 VITALS
SYSTOLIC BLOOD PRESSURE: 122 MMHG | BODY MASS INDEX: 30.83 KG/M2 | DIASTOLIC BLOOD PRESSURE: 84 MMHG | HEIGHT: 64 IN | WEIGHT: 180.6 LBS | HEART RATE: 85 BPM | OXYGEN SATURATION: 99 %

## 2023-01-10 DIAGNOSIS — G43.109 MIGRAINE WITH AURA AND WITHOUT STATUS MIGRAINOSUS, NOT INTRACTABLE: Primary | ICD-10-CM

## 2023-01-10 PROCEDURE — 64615 CHEMODENERV MUSC MIGRAINE: CPT | Performed by: PSYCHIATRY & NEUROLOGY

## 2023-01-10 NOTE — PROGRESS NOTES
Botox Procedure Note-    Current headache frequency: 1-2_ headaches days / month .     The risks and benefits were discussed with the patient. The patient was given the opportunity to ask questions. Informed consent form was signed.     Onabotulinumtoxin A was reconstituted with 0.9% normal saline. All injections sites were prepped with alcohol swab. Approximately 5 units of botox were injected into each of the following sites  as per routine migraine botox injection PREEMPT protocol:  procerus (1 injection), frontalis (2 injections), temporalis (8 injections), occipitalis (6 injections) cervical, upper cervical paraspinals (4 injections), and trapezius (6 injections) for a total of 28 sites.  The patient tolerated the procedure well. There were no immediate complications. The patient will follow up in approximately 12 weeks for her next injection.     Total amount of onabotulinumtoxin A injected was 145 units with 45 units wasted.     Additional notes- her headaches continue to be well controlled.     -Botox was patient supplied.

## 2023-03-31 ENCOUNTER — PRIOR AUTHORIZATION (OUTPATIENT)
Dept: NEUROLOGY | Facility: CLINIC | Age: 38
End: 2023-03-31
Payer: MEDICAID

## 2023-04-13 ENCOUNTER — PROCEDURE VISIT (OUTPATIENT)
Dept: NEUROLOGY | Facility: CLINIC | Age: 38
End: 2023-04-13
Payer: MEDICAID

## 2023-04-13 VITALS — DIASTOLIC BLOOD PRESSURE: 100 MMHG | SYSTOLIC BLOOD PRESSURE: 140 MMHG

## 2023-04-13 DIAGNOSIS — G43.109 MIGRAINE WITH AURA AND WITHOUT STATUS MIGRAINOSUS, NOT INTRACTABLE: Primary | ICD-10-CM

## 2023-04-13 PROCEDURE — 64615 CHEMODENERV MUSC MIGRAINE: CPT | Performed by: PSYCHIATRY & NEUROLOGY

## 2023-04-13 NOTE — PROGRESS NOTES
Botox Procedure Note-    Current headache frequency: 1-2_ headaches days / month .     The risks and benefits were discussed with the patient. The patient was given the opportunity to ask questions. Informed consent form was signed.     Onabotulinumtoxin A was reconstituted with 0.9% normal saline. All injections sites were prepped with alcohol swab. Approximately 5 units of botox were injected into each of the following sites  as per routine migraine botox injection PREEMPT protocol:  procerus (1 injection), frontalis (2 injections), temporalis (8 injections), occipitalis (6 injections) cervical, upper cervical paraspinals (4 injections), and trapezius (6 injections) for a total of 28 sites.  The patient tolerated the procedure well. There were no immediate complications. The patient will follow up in approximately 12 weeks for her next injection.     Total amount of onabotulinumtoxin A injected was 155 units with 45 units wasted.     Additional notes- her headaches continue to be well controlled.  She has only had 2 headaches since her last visit that were preceded by visual auras for which she took Ubrelvy.    -Botox was patient supplied.

## 2023-05-12 ENCOUNTER — TELEPHONE (OUTPATIENT)
Dept: NEUROLOGY | Facility: CLINIC | Age: 38
End: 2023-05-12
Payer: MEDICAID

## 2023-05-12 NOTE — TELEPHONE ENCOUNTER
AFTER SPEAKING WITH PATIENT REGARDING THE NEED TO RESCHEDULE HER BOTOX ON 7.7.23, PATIENT STATED SHE WILL GO ELSE WHERE IF DR GAY WILL NOT GET HER IN ON HER 12 WEEK TIME FRAME. INFORMED HER I WOULD SEND A MESSAGE TO DR GAY AND WE WILL GET BACK TO HER NEXT WEEK. PATIENT UNDERSTOOD AND AGREED WITH THE PLAN.

## 2023-05-23 ENCOUNTER — TRANSCRIBE ORDERS (OUTPATIENT)
Dept: ADMINISTRATIVE | Facility: HOSPITAL | Age: 38
End: 2023-05-23
Payer: MEDICAID

## 2023-05-23 DIAGNOSIS — Z12.31 VISIT FOR SCREENING MAMMOGRAM: Primary | ICD-10-CM

## 2023-05-30 DIAGNOSIS — G43.909 MIGRAINE WITHOUT STATUS MIGRAINOSUS, NOT INTRACTABLE, UNSPECIFIED MIGRAINE TYPE: ICD-10-CM

## 2023-05-30 DIAGNOSIS — R79.0 LOW FERRITIN LEVEL: ICD-10-CM

## 2023-05-30 DIAGNOSIS — N92.0 MENORRHAGIA WITH REGULAR CYCLE: ICD-10-CM

## 2023-05-31 DIAGNOSIS — N92.0 MENORRHAGIA WITH REGULAR CYCLE: ICD-10-CM

## 2023-05-31 DIAGNOSIS — R79.0 LOW FERRITIN LEVEL: ICD-10-CM

## 2023-05-31 DIAGNOSIS — G43.909 MIGRAINE WITHOUT STATUS MIGRAINOSUS, NOT INTRACTABLE, UNSPECIFIED MIGRAINE TYPE: ICD-10-CM

## 2023-05-31 RX ORDER — NORETHINDRONE ACETATE AND ETHINYL ESTRADIOL AND FERROUS FUMARATE 1MG-20(24)
1 KIT ORAL DAILY
Qty: 84 TABLET | Refills: 4 | Status: SHIPPED | OUTPATIENT
Start: 2023-05-31

## 2023-05-31 RX ORDER — NORETHINDRONE ACETATE AND ETHINYL ESTRADIOL AND FERROUS FUMARATE 1MG-20(24)
1 KIT ORAL DAILY
Qty: 56 TABLET | Refills: 0 | OUTPATIENT
Start: 2023-05-31

## 2023-09-29 ENCOUNTER — PROCEDURE VISIT (OUTPATIENT)
Dept: NEUROLOGY | Facility: CLINIC | Age: 38
End: 2023-09-29
Payer: COMMERCIAL

## 2023-09-29 DIAGNOSIS — G43.109 MIGRAINE WITH AURA AND WITHOUT STATUS MIGRAINOSUS, NOT INTRACTABLE: Primary | ICD-10-CM

## 2023-09-29 NOTE — PROGRESS NOTES
Botox Procedure Note-    Current headache frequency: 1-2_ headaches days / month .     The risks and benefits were discussed with the patient. The patient was given the opportunity to ask questions. Informed consent form was signed.     Onabotulinumtoxin A was reconstituted with 0.9% normal saline. All injections sites were prepped with alcohol swab. Approximately 5 units of botox were injected into each of the following sites  as per routine migraine botox injection PREEMPT protocol:  procerus (1 injection), frontalis (2 injections), temporalis (8 injections), occipitalis (6 injections) cervical, upper cervical paraspinals (4 injections), and trapezius (6 injections) for a total of 28 sites.  The patient tolerated the procedure well. There were no immediate complications. The patient will follow up in approximately 12 weeks for her next injection.     Total amount of onabotulinumtoxin A injected was 155 units with 45 units wasted.     Additional notes-patient states that her headaches have been extremely well controlled and she has not had to take any Ubrelvy recently.  She started propranolol but it made her extremely tired therefore she stopped it.  Her blood pressure however has been running within normal limits.  She was recently started on Vyvanse for ADD.    -Botox was patient supplied.

## 2023-11-20 ENCOUNTER — TELEPHONE (OUTPATIENT)
Dept: OBSTETRICS AND GYNECOLOGY | Facility: CLINIC | Age: 38
End: 2023-11-20
Payer: COMMERCIAL

## 2023-11-20 NOTE — TELEPHONE ENCOUNTER
----- Message from Aayush Brito sent at 11/20/2023 11:30 AM EST -----  Pt states she will be out of birth control by the time of her Annual. Would you be able to send a refill to [facility] in [place]? Thank you!

## 2023-11-21 DIAGNOSIS — G43.909 MIGRAINE WITHOUT STATUS MIGRAINOSUS, NOT INTRACTABLE, UNSPECIFIED MIGRAINE TYPE: ICD-10-CM

## 2023-11-21 DIAGNOSIS — N92.0 MENORRHAGIA WITH REGULAR CYCLE: ICD-10-CM

## 2023-11-21 DIAGNOSIS — R79.0 LOW FERRITIN LEVEL: ICD-10-CM

## 2023-11-21 RX ORDER — NORETHINDRONE ACETATE AND ETHINYL ESTRADIOL AND FERROUS FUMARATE 1MG-20(24)
1 KIT ORAL DAILY
Qty: 84 TABLET | Refills: 1 | Status: SHIPPED | OUTPATIENT
Start: 2023-11-21

## 2023-12-21 ENCOUNTER — TELEPHONE (OUTPATIENT)
Dept: OBSTETRICS AND GYNECOLOGY | Facility: CLINIC | Age: 38
End: 2023-12-21

## 2023-12-21 NOTE — TELEPHONE ENCOUNTER
Caller: MARILEE MERCER    Phone Number: [phone]    Reason for Call: SAME DAY CANCELLATION -  HAD TO HAVE EMERGENCY SURGERY    PT R/S W/ DR WILKINSON FOR 02/23/24    THANK YOU!

## 2023-12-28 ENCOUNTER — PRIOR AUTHORIZATION (OUTPATIENT)
Dept: NEUROLOGY | Facility: CLINIC | Age: 38
End: 2023-12-28

## 2023-12-28 NOTE — TELEPHONE ENCOUNTER
I WENT THROUGH [employer] AND CHATTED WITH THE PAYOR NATALIA AND NO AUTHORIZATION IS REQUIRED. THEY DO SUGGEST PRE DETERMINATION.  DEPARTMENT IS THE ONE THAT HANDLES AUTHORIZATIONS FOR ANTHEM. PHONE NUMBER [phone]. FOUND A FORM AND FAXED CLINICALS [phone]    BOTOX 200 UNITS      INSURANCE  ANTHEM      BOTOX WILL BE THROUGH MEDICAL BENEFIT         BUY AND BILL

## 2023-12-29 ENCOUNTER — PATIENT ROUNDING (BHMG ONLY) (OUTPATIENT)
Dept: NEUROLOGY | Facility: CLINIC | Age: 38
End: 2023-12-29
Payer: COMMERCIAL

## 2023-12-29 ENCOUNTER — PROCEDURE VISIT (OUTPATIENT)
Dept: NEUROLOGY | Facility: CLINIC | Age: 38
End: 2023-12-29
Payer: COMMERCIAL

## 2023-12-29 DIAGNOSIS — G43.109 MIGRAINE WITH AURA AND WITHOUT STATUS MIGRAINOSUS, NOT INTRACTABLE: Primary | ICD-10-CM

## 2023-12-29 PROBLEM — F98.8 ATTENTION DEFICIT DISORDER (ADD) WITHOUT HYPERACTIVITY: Status: ACTIVE | Noted: 2023-09-05

## 2023-12-29 RX ORDER — DEXMETHYLPHENIDATE HYDROCHLORIDE 20 MG/1
20 CAPSULE, EXTENDED RELEASE ORAL DAILY
COMMUNITY
Start: 2023-11-22

## 2023-12-29 NOTE — PROGRESS NOTES
Chief Complaint   Patient presents with    Botulinum Toxin Injection     200 units B/B       CC:  Botox injection  Indication for procedure: Chronic Migraines      HPI : Patient is in clinic for Botox injection.  Current headache frequency is approximately 3-4 headache days in a month with some headaches lasting for more than 4 hours.  Date of last Injection: 9/29/2023  Response: Overall headaches are much improved prior to botox but she has noted increased frequency of migraines in the last 3 months which she does correlate with increased stress.     Botox Procedure Note    Current headache frequency: 3-4 headaches days / month.    The risks and benefits were discussed with the patient. The patient was given the opportunity to ask questions. Informed consent form was signed.    Onabotulinumtoxin A was reconstituted with 0.9% normal saline. All injections sites were prepped with alcohol swab. Approximately 5 units of botox were injected into each of the following sites bilaterally as per routine migraine botox injection PREEMPT protocol: , procerus, frontalis, temporalis, occipitalis, upper cervical paraspinals, and trapezius.    The total amount injected in units is 155.  The total amount wasted in units is 45.  The total amount submitted in units is 200.  Botox was supplied by [facility].    Patient tolerated procedure well with no immediate complications.     The patient tolerated the procedure well. There were no immediate complications. The patient will follow up in approximately 12 weeks for their next injection.    Additional notes: She reports that over the last few months she has needed increased use of Ubrelvy d/t more frequent migraines. PCP started her on Emgality but she did not try initially d/t concern of SE. She plans on following up with PCP regarding Emgality and starting this medication as she has already picked it up from her pharmacy, I did let our pharmacy team know as well today, if  she decides to stay on Emgality we will plan to transition this to our pharmacy team at her next appointment. Ubrelvy works well for abortive treatment of migraines without side effects.     Latrice Sullivan, APRN  12/29/2023

## 2024-02-21 ENCOUNTER — OFFICE VISIT (OUTPATIENT)
Dept: OBSTETRICS AND GYNECOLOGY | Facility: CLINIC | Age: 39
End: 2024-02-21
Payer: COMMERCIAL

## 2024-02-21 VITALS
HEIGHT: 66 IN | BODY MASS INDEX: 28.45 KG/M2 | WEIGHT: 177 LBS | DIASTOLIC BLOOD PRESSURE: 70 MMHG | SYSTOLIC BLOOD PRESSURE: 120 MMHG

## 2024-02-21 DIAGNOSIS — N95.1 MENOPAUSAL SYMPTOMS: ICD-10-CM

## 2024-02-21 DIAGNOSIS — N92.1 MENORRHAGIA WITH IRREGULAR CYCLE: ICD-10-CM

## 2024-02-21 DIAGNOSIS — Z01.411 ENCOUNTER FOR GYNECOLOGICAL EXAMINATION (GENERAL) (ROUTINE) WITH ABNORMAL FINDINGS: Primary | ICD-10-CM

## 2024-02-21 DIAGNOSIS — Z12.31 ENCOUNTER FOR SCREENING MAMMOGRAM FOR BREAST CANCER: ICD-10-CM

## 2024-02-21 DIAGNOSIS — G43.909 MIGRAINE WITHOUT STATUS MIGRAINOSUS, NOT INTRACTABLE, UNSPECIFIED MIGRAINE TYPE: ICD-10-CM

## 2024-02-21 DIAGNOSIS — F32.81 PMDD (PREMENSTRUAL DYSPHORIC DISORDER): ICD-10-CM

## 2024-02-21 RX ORDER — NORETHINDRONE ACETATE AND ETHINYL ESTRADIOL AND FERROUS FUMARATE 1MG-20(24)
1 KIT ORAL DAILY
Qty: 84 TABLET | Refills: 3 | Status: CANCELLED | OUTPATIENT
Start: 2024-02-21

## 2024-02-21 RX ORDER — METHYLPHENIDATE HYDROCHLORIDE 10 MG/1
10 TABLET ORAL DAILY
COMMUNITY
Start: 2024-02-09 | End: 2024-03-10

## 2024-02-21 RX ORDER — LEVONORGESTREL AND ETHINYL ESTRADIOL 0.15-0.03
1 KIT ORAL DAILY
Qty: 91 TABLET | Refills: 3 | Status: SHIPPED | OUTPATIENT
Start: 2024-02-21 | End: 2025-02-20

## 2024-02-21 NOTE — PROGRESS NOTES
Chief Complaint  Gynecologic Exam     History of Present Illness:  Patient is 38 y.o.  who presents to [facility] here for her annual examination.  Patient has been on Minestrin for 2 years.  She reports she is still having mood swings prior to and during her menstrual cycle.  She does report having improvement however in her migraines.  She has been taking her Minestrin daily.  She reports some months she will not have a menstrual cycle and other months she will have spotting followed by a normal cycle and spotting again.  She has been taking her medication daily.  Her menstrual cycles are still heavy however.  She has had a few hot flashes and night sweats as well.  She sees Dr. Chew for her primary care.  She did have a mammogram in July.  She did have labs with her primary care provider.    History  Past Medical History:   Diagnosis Date    Abnormal Pap smear of cervix     Abnormal Pap smear of cervix 2020    LSIL    BRCA1 negative     BRCA2 negative     Cervical dysplasia     HPV (human papilloma virus) infection     Migraine      Current Outpatient Medications on File Prior to Visit   Medication Sig Dispense Refill    methylphenidate (RITALIN) 10 MG tablet Take 1 tablet by mouth Daily.      dexmethylphenidate XR (FOCALIN XR) 20 MG 24 hr capsule Take 1 capsule by mouth Daily      OnabotulinumtoxinA 200 units reconstituted solution Inject 200 units IM into head neck shoulders every 12 weeks per FDA protocol. Dx:G43.709 ship to [facility]. 1 each 3    OnabotulinumtoxinA 200 units reconstituted solution Inject 200 Units into the appropriate muscle as directed by prescriber Every 3 (Three) Months. 1 each 3    ubrogepant (Ubrelvy) 100 MG tablet Take 1 tablet by mouth As Needed (for headache). May repeat once in 2 hours.  Do not take over 2 tabs a day or 8 tabs a month 8 tablet 3    [DISCONTINUED] galcanezumab-gnlm (EMGALITY) 120 MG/ML auto-injector pen Inject 1 mL  "under the skin into the appropriate area as directed Every 28 (Twenty-Eight) Days.      [DISCONTINUED] indomethacin (INDOCIN) 50 MG capsule Take 1 capsule by mouth As Needed for Mild Pain . Can take up to 3 capsules a day. 90 capsule 1    [DISCONTINUED] Norethin Ace-Eth Estrad-FE 1-20 MG-MCG(24) chewable tablet Chew 1 tablet Daily. 84 tablet 1     Current Facility-Administered Medications on File Prior to Visit   Medication Dose Route Frequency Provider Last Rate Last Admin    OnabotulinumtoxinA 200 Units  200 Units Intramuscular Q3 Months Sravani Mccauley MD   200 Units at 23 1611     Allergies   Allergen Reactions    Sulfa Antibiotics Unknown (See Comments)           Past Surgical History:   Procedure Laterality Date    DILATATION AND CURETTAGE      TUBAL ABDOMINAL LIGATION      WISDOM TOOTH EXTRACTION       Family History   Problem Relation Age of Onset    Diabetes Mother     Hypertension Father     Heart disease Father     Diabetes Father     Diabetes Brother     Breast cancer Paternal Grandmother     Breast cancer Paternal Aunt     Ovarian cancer Neg Hx      Social History     Socioeconomic History    Marital status:    Tobacco Use    Smoking status: Former     Years: 4     Types: Cigarettes     Quit date:      Years since quittin.1    Smokeless tobacco: Never   Vaping Use    Vaping Use: Never used   Substance and Sexual Activity    Alcohol use: No    Drug use: No    Sexual activity: Yes     Partners: Male     Birth control/protection: OCP, Surgical       Physical Examination:  Vital Signs: /70   Ht 167.6 cm (66\")   Wt 80.3 kg (177 lb)   BMI 28.57 kg/m²     General Appearance: alert, appears stated age, and cooperative  Breasts: Examined in supine position  Symmetric without masses or skin dimpling  Nipples normal without inversion, lesions or discharge  There are no palpable axillary nodes  Abdomen: no masses, no hepatomegaly, no splenomegaly, soft non-tender, no guarding, and " no rebound tenderness  Pelvic: Clinical staff was present for exam; pelvic examination required for evaluation.  External genitalia:  normal appearance of the external genitalia including Bartholin's and Lipscomb's glands.  :  urethral meatus normal;  Vaginal:  normal pink mucosa without prolapse or lesions.  Cervix:  normal appearance.  Uterus:  normal size, shape and consistency.  Adnexa:  normal bimanual exam of the adnexa.  Pap smear done and specimen sent using Thin-Prep technique    Data Review:  The following data was reviewed by: Randa Mcghee MD on 02/21/2024:     Labs:  COMPREHENSIVE METABOLIC PANEL (09/23/2023 09:50)  LIPID PANEL (09/23/2023 09:50)  T4, FREE (09/23/2023 09:50)  TSH (09/23/2023 09:50)  Triiodothyronine, Free (Free T3)(SENDOUT) (09/23/2023 09:50)  CBC with automated diff (09/23/2023 09:50)  Imaging:  Mammo Screening Digital Tomosynthesis Bilateral With CAD (07/05/2023 15:56)   Medical Records:  None    Assessment and Plan   1. Encounter for screening mammogram for breast cancer  It is recommended per ACOG, for women at average risk to start annual mammogram screening at the age of 40 until the age of 75 and an individualized decision be made for women after age 75.  She was encouraged to continue getting yearly mammograms.  She should report any palpable breast lump(s) or skin changes regardless of mammographic findings.  I explained to Hermila that notification regarding her mammogram results will come from the center performing the study.  Our office will not be routinely calling with mammogram results.  It is her responsibility to make sure that the results from the mammogram are communicated to her by the breast center.  If she has any questions about the results, she is welcome to call our office anytime.  The patient reports she will schedule her mammogram.    Hermila was counseled regarding having clinical breast exams and breast self-awareness.  Women aged 29-39 years of age should have  clinical breast exams every 1-3 years and yearly aged 40 and older.  The patient was counseled regarding breast self-awareness focusing on having a sense of what is normal for her breasts so that she can tell if there are changes.  Even small changes should be reported to provider.   - Mammo Screening Digital Tomosynthesis Bilateral With CAD; Future    2. Encounter for gynecological examination (general) (routine) with abnormal findings  Pap was done today.  If she does not receive the results of the Pap within 2 weeks  time, she was instructed to call to find out the results.  I explained to Hermila that the recommendations for Pap smear interval in a low risk patient has lengthened to 3 years time if cytology alone normal or  5 years time if both cytology and HPV testing were normal.  I encouraged her to be seen yearly for a full physical exam including breast and pelvic exam even during the off years when PAP's will not be performed.   - LIQUID-BASED PAP SMEAR WITH HPV GENOTYPING IF ASCUS (CORTNEY,COR,MAD)    3. Menorrhagia with irregular cycle  I have discussed with the patient the various options for management of her symptoms.  Will plan a trial with Seasonale.  Patient can take continuously as discussed.  Instructions and precautions have been given.  She is to call if worsening and/or no improvement in her symptoms.  - levonorgestrel-ethinyl estradiol (SEASONALE) 0.15-0.03 MG per tablet; Take 1 tablet by mouth Daily.  Dispense: 91 tablet; Refill: 3    4. Migraine without status migrainosus, not intractable, unspecified migraine type  Patient with improvement in her migraines as noted.    5. PMDD (premenstrual dysphoric disorder)  Will plan a trial with Seasonale as discussed.  Patient may take continuously as well.  Instructions and precautions have been given.  Patient is to call if no improvement in her symptoms.  - levonorgestrel-ethinyl estradiol (SEASONALE) 0.15-0.03 MG per tablet; Take 1 tablet by mouth  Daily.  Dispense: 91 tablet; Refill: 3    6. Menopausal symptoms  The various options for the management of menopausal symptoms was discussed.  The medical treatment options discussed include HRT, SSRIs, SSNRIs, clonidine, and gabapentin.  The risks and benefits were discussed including the findings from the WHI study.  The increased risk of breast cancer, CAD, stroke, and VTE events were discussed for combination therapy vs the increased risk of CV events and breast cancer not being seen in the estrogen only group.  The lowest effective dose for the shortest duration of treatment was discussed in regards to HRT.  Other alternatives including otc supplements and lifestyle changes were also discussed.  Local estrogen therapy to relieve atrophic vaginal symptoms was discussed was well as other alternatives.   - levonorgestrel-ethinyl estradiol (SEASONALE) 0.15-0.03 MG per tablet; Take 1 tablet by mouth Daily.  Dispense: 91 tablet; Refill: 3    Follow Up/Instructions:  Follow up as noted.  Patient was given instructions and counseling regarding her condition or for health maintenance advice. Please see specific information pulled into the AVS if appropriate.     Note: Speech recognition transcription software may have been used to dictate portions of this document.  An attempt at proofreading has been made though minor errors in transcription may still be present.    This note was electronically signed.  Randa Mcghee M.D.

## 2024-02-27 LAB — REF LAB TEST METHOD: NORMAL

## 2024-03-15 ENCOUNTER — SPECIALTY PHARMACY (OUTPATIENT)
Dept: LAB | Facility: HOSPITAL | Age: 39
End: 2024-03-15
Payer: COMMERCIAL

## 2024-03-15 NOTE — PROGRESS NOTES
Specialty Pharmacy Patient Management Program  Neurology Initial Assessment     Hermila Sierra is a 39 y.o. female with migraines seen by a Neurology provider and enrolled in the Neurology Patient Management program offered by [facility].  An initial outreach was conducted, including assessment of therapy appropriateness and specialty medication education for Botox. The patient was introduced to services offered by [facility], including: regular assessments, refill coordination, curbside pick-up or mail order delivery options, prior authorization maintenance, and financial assistance programs as applicable. The patient was also provided with contact information for the pharmacy team.     Insurance Coverage & Financial Support  [employer]  Botox Co-Pay Card    Relevant Past Medical History and Comorbidities  Relevant medical history and concomitant health conditions were discussed with the patient. The patient's chart has been reviewed for relevant past medical history and comorbid health conditions and updated as necessary.   Past Medical History:   Diagnosis Date    Abnormal Pap smear of cervix     Abnormal Pap smear of cervix 2020    LSIL    BRCA1 negative     BRCA2 negative     Cervical dysplasia     HPV (human papilloma virus) infection     Migraine      Social History     Socioeconomic History    Marital status:    Tobacco Use    Smoking status: Former     Current packs/day: 0.00     Types: Cigarettes     Start date:      Quit date:      Years since quittin.2    Smokeless tobacco: Never   Vaping Use    Vaping status: Never Used   Substance and Sexual Activity    Alcohol use: No    Drug use: No    Sexual activity: Yes     Partners: Male     Birth control/protection: OCP, Surgical     Problem list reviewed by Pooja Sutton, PharmD on 3/15/2024 at  1:47 PM    Allergies  Known allergies and reactions were discussed with the patient.  The patient's chart has been reviewed for  allergy information and updated as necessary.   Allergies   Allergen Reactions    Sulfa Antibiotics Unknown (See Comments)           Allergies reviewed by Pooja Sutton, Bruce on 3/15/2024 at  1:47 PM    Relevant Laboratory Values      Lab Assessment  N/A    Current Medication List  This medication list has been reviewed with the patient and evaluated for any interactions or necessary modifications/recommendations, and updated to include all prescription medications, OTC medications, and supplements the patient is currently taking.  This list reflects what is contained in the patient's profile, which has also been marked as reviewed to communicate to other providers it is the most up to date version of the patient's current medication therapy.     Current Outpatient Medications:     OnabotulinumtoxinA 200 units reconstituted solution, Inject 200 Units into the appropriate muscle as directed by prescriber Every 3 (Three) Months., Disp: 1 each, Rfl: 3    dexmethylphenidate XR (FOCALIN XR) 20 MG 24 hr capsule, Take 1 capsule by mouth Daily, Disp: , Rfl:     levonorgestrel-ethinyl estradiol (SEASONALE) 0.15-0.03 MG per tablet, Take 1 tablet by mouth Daily., Disp: 91 tablet, Rfl: 3    methylphenidate (RITALIN) 10 MG tablet, Take 1 tablet by mouth Daily., Disp: , Rfl:     ubrogepant (Ubrelvy) 100 MG tablet, Take 1 tablet by mouth As Needed (for headache). May repeat once in 2 hours.  Do not take over 2 tabs a day or 8 tabs a month, Disp: 8 tablet, Rfl: 3  No current facility-administered medications for this visit.    Medicines reviewed by Pooja Sutton, PharmD on 3/15/2024 at  1:47 PM    Drug Interactions  none     Initial Education Provided for Specialty Medication  The patient has been provided with the following education and any applicable administration techniques (i.e. self-injection) have been demonstrated for the therapies indicated. All questions and concerns  have been addressed prior to the patient receiving the medication, and the patient has verbalized understanding of the education and any materials provided.  Additional patient education shall be provided and documented upon request by the patient, provider or payer.            Botox (onabotulinumtoxinA)      Medication Expectations   Why am I taking this medication? For prevention and relief of migraines.   What should I expect while on this medication? You should expect to see a decrease in the severity and frequency of migraines..     How does the medication work? Botox enters the nerve endings around where it is injected and blocks the release of chemicals involved in pain transmission.  This prevents activation of pain networks in the brain.   How long will I be on this medication for? The amount of time you will be on this medication will be determined by your doctor based your response.    How do I take this medication? This medication will be given in the office.  It will be given IM into each of 31 sites divided across 7 specific head/neck muscle areas.     What are some possible side effects? Potential side effects including, but not limited to: neck pain and stiffness, neck weakness,  temporary drooping of the eye, rare flu-like symptoms (such as muscle aches and fever), dry eyes, injections site pain, bleeding, infection, failure of the procedure to help.  More serious side effect such as allergic reaction, dysphagia, respiratory distress, .  Discussed that it may take 10-14 days after first treatment to see improvement of headaches, and that the in-office treatments are done every 12 weeks.  I gave the patient my name and contact information for any additional questions or concerns.       What happens if I miss a dose? N/A                Medication Safety   What are things I should warn my doctor immediately about? Let the provider know immediately if you have difficulty breathing or swallowing,  weakness of neck muscles.   What are things that I should be cautious of?  Injections near the eye: This medicine may reduce blinking, which can raise the risk of eye problems, including corneal exposure and ulcers. Tell your doctor right away if you notice that you are blinking less than usual or your eyes feel dry   What are some medications that can interact with this one? N/A          Medication Storage/Handling   How should I handle this medication? N/A   How does this medication need to be stored? N/A   How should I dispose of this medication? N/A          Resources/Support   How can I remind myself to take this medication? Patient will be scheduled every 3 months in clinic.   Is financial support available?  [employer] Botox savings program.   Which vaccines are recommended for me? Talk to your doctor about these vaccines: Flu, Coronavirus (COVID-19), Pneumococcal (pneumonia), Tdap, Hepatitis B, Zoster (shingles)              Adherence and Self-Administration  Adherence related to the patient's specialty therapy was discussed with the patient. The Adherence segment of this outreach has been reviewed and updated.   Is there a concern with patient's ability to self administer the medication correctly and without issue?: No  Were any potential barriers to adherence identified during the initial assessment or patient education?: No  Are there any concerns regarding the patient's understanding of the importance of medication adherence?: No  Methods for Supporting Patient Adherence and/or Self-Administration: none    Goals of Therapy  Goals related to the patient's specialty therapy were discussed with the patient. The Patient Goals segment of this outreach has been reviewed and updated.   Goals Addressed Today        Specialty Pharmacy General Goal      Decrease frequency, severity and duration of migraine attacks from baseline (~5 per month)                 Reassessment Plan & Follow-Up  Medication Therapy Changes:  Botox 155 units IM to be administered by Provider in office every 3 months  Related Plans, Therapy Recommendations, or Therapy Problems to Be Addressed: none  Pharmacist to perform regular reassessments no more than (6) months from the previous assessment.  Care Coordinator to set up future refill outreaches, coordinate prescription delivery, and escalate clinical questions to pharmacist.   Welcome information and patient satisfaction survey to be sent by specialty pharmacy team with patient's initial fill.    Attestation  Therapeutic appropriateness: Appropriate   I attest the patient was actively involved in and has agreed to the above plan of care. If the prescribed therapy is at any point deemed not appropriate based on the current or future assessments, a consultation will be initiated with the patient's specialty care provider to determine the best course of action. The revised plan of therapy will be documented along with any additional patient education provided. Discussed aforementioned material with patient via telemedicine.    Pooja Sutton, PharmD, [facility]  Clinic Specialty Pharmacist, Neurology  3/15/2024  13:49 EDT

## 2024-03-29 ENCOUNTER — PROCEDURE VISIT (OUTPATIENT)
Dept: NEUROLOGY | Facility: CLINIC | Age: 39
End: 2024-03-29
Payer: COMMERCIAL

## 2024-03-29 VITALS
HEIGHT: 66 IN | BODY MASS INDEX: 29.73 KG/M2 | DIASTOLIC BLOOD PRESSURE: 86 MMHG | SYSTOLIC BLOOD PRESSURE: 136 MMHG | WEIGHT: 185 LBS

## 2024-03-29 DIAGNOSIS — G43.109 MIGRAINE WITH AURA AND WITHOUT STATUS MIGRAINOSUS, NOT INTRACTABLE: Primary | ICD-10-CM

## 2024-03-29 NOTE — PROGRESS NOTES
Botox Procedure Note-    Current headache frequency: 1-2_ headaches days / month .     The risks and benefits were discussed with the patient. The patient was given the opportunity to ask questions. Informed consent form was signed.     Onabotulinumtoxin A was reconstituted with 0.9% normal saline. All injections sites were prepped with alcohol swab. Approximately 5 units of botox were injected into each of the following sites  as per routine migraine botox injection PREEMPT protocol:  procerus (1 injection), frontalis (2 injections), temporalis (8 injections), occipitalis (6 injections) cervical, upper cervical paraspinals (4 injections), and trapezius (6 injections) for a total of 28 sites.  The patient tolerated the procedure well. There were no immediate complications. The patient will follow up in approximately 12 weeks for her next injection.     Total amount of onabotulinumtoxin A injected was 155 units with 45 units wasted.     Additional notes-patient states that her headaches have been extremely well controlled.  She has had a few visual auras for which she has taken Ubrelvy that helped.  Her PCP also tried to start her on Emgality shots instead of her birth control pills that she has been taking for hormonal headaches but she was unable to take the shot.  Has now been switched to a different hormonal contraceptive to see if the side effects improve  -Botox was patient supplied.

## 2024-06-10 ENCOUNTER — SPECIALTY PHARMACY (OUTPATIENT)
Dept: NEUROLOGY | Facility: CLINIC | Age: 39
End: 2024-06-10
Payer: COMMERCIAL

## 2024-06-10 NOTE — PROGRESS NOTES
Specialty Pharmacy Refill Coordination Note     Hermila is a 39 y.o. female contacted today regarding refills of  Botox to [facility] office--appt is 6/21    Reviewed and verified with patient:       Specialty medication(s) and dose(s) confirmed: yes    Refill Questions      Flowsheet Row Most Recent Value   Changes to allergies? No   Changes to medications? No   New conditions or infections since last clinic visit No   Unplanned office visit, urgent care, ED, or hospital admission in the last 4 weeks  No   How does patient/caregiver feel medication is working? Very good   Financial problems or insurance changes  No   Since the previous refill, were any specialty medication doses or scheduled injections missed or delayed?  No   Does this patient require a clinical escalation to a pharmacist? No            Delivery Questions      Flowsheet Row Most Recent Value   Delivery method [employer]   Delivery address verified with patient/caregiver? Yes   Delivery address Other (Comment)  [[address]]   Number of medications in delivery 1   Medication(s) being filled and delivered Onabotulinumtoxina   Doses left of specialty medications Botox appt is 6/21/24   Copay verified? Yes   Copay amount $0 insurance/copay card   Copay form of payment No copayment ($0)                   Follow-up: 3 month(s)     Pooja Sutton, FrankoD

## 2024-06-21 ENCOUNTER — PROCEDURE VISIT (OUTPATIENT)
Dept: NEUROLOGY | Facility: CLINIC | Age: 39
End: 2024-06-21
Payer: COMMERCIAL

## 2024-06-21 VITALS — BODY MASS INDEX: 29.73 KG/M2 | HEIGHT: 66 IN | WEIGHT: 185 LBS

## 2024-06-21 DIAGNOSIS — G43.109 MIGRAINE WITH AURA AND WITHOUT STATUS MIGRAINOSUS, NOT INTRACTABLE: Primary | ICD-10-CM

## 2024-06-21 NOTE — PROGRESS NOTES
Botox Procedure Note-    Current headache frequency: 1_ headaches days / month .     The risks and benefits were discussed with the patient. The patient was given the opportunity to ask questions. Informed consent form was signed.     Onabotulinumtoxin A was reconstituted with 0.9% normal saline. All injections sites were prepped with alcohol swab. Approximately 5 units of botox were injected into each of the following sites  as per routine migraine botox injection PREEMPT protocol:  procerus (1 injection), frontalis (2 injections), temporalis (8 injections), occipitalis (6 injections) cervical, upper cervical paraspinals (4 injections), and trapezius (6 injections) for a total of 28 sites.  The patient tolerated the procedure well. There were no immediate complications. The patient will follow up in approximately 12 weeks for her next injection.     Total amount of onabotulinumtoxin A injected was 155 units with 45 units wasted.     Additional notes-patient states that her headaches have been extremely well controlled.  In fact she has not had to take any Ubrelvy last month.  She has also stopped her hormonal contraceptive pills as it was causing increased duration of bleeding.  Headaches are better since stopping it.  She has had some hearing loss in the right ear for the past few days.  I examined her ear and it shows a large piece of wax.  Her PCP is giving her an ENT referral.    -Botox was patient supplied.

## 2024-07-08 ENCOUNTER — HOSPITAL ENCOUNTER (OUTPATIENT)
Dept: MAMMOGRAPHY | Facility: HOSPITAL | Age: 39
Discharge: HOME | End: 2024-07-08
Admitting: OBSTETRICS & GYNECOLOGY
Payer: COMMERCIAL

## 2024-07-08 DIAGNOSIS — Z12.31 ENCOUNTER FOR SCREENING MAMMOGRAM FOR BREAST CANCER: ICD-10-CM

## 2024-07-08 PROCEDURE — 77067 SCR MAMMO BI INCL CAD: CPT

## 2024-07-08 PROCEDURE — 77063 BREAST TOMOSYNTHESIS BI: CPT

## 2024-08-06 ENCOUNTER — SPECIALTY PHARMACY (OUTPATIENT)
Dept: GENERAL RADIOLOGY | Facility: HOSPITAL | Age: 39
End: 2024-08-06
Payer: COMMERCIAL

## 2024-08-06 NOTE — PROGRESS NOTES
Specialty Pharmacy Patient Management Program  Neurology Reassessment     Hermila Sierra is a 39 y.o. female with migraines seen by a Neurology provider and enrolled in the Neurology Patient Management program offered by [facility].  A follow-up outreach was conducted, including assessment of continued therapy appropriateness, medication adherence, and side effect incidence and management for Botox.     Changes to Insurance Coverage or Financial Support  none     Relevant Past Medical History and Comorbidities  Relevant medical history and concomitant health conditions were discussed with the patient. The patient's chart has been reviewed for relevant past medical history and comorbid health conditions and updated as necessary.   Past Medical History:   Diagnosis Date    Abnormal Pap smear of cervix     Abnormal Pap smear of cervix 2020    LSIL    BRCA1 negative     BRCA2 negative     Cervical dysplasia     HPV (human papilloma virus) infection     Migraine      Social History     Socioeconomic History    Marital status:    Tobacco Use    Smoking status: Former     Current packs/day: 0.00     Types: Cigarettes     Start date:      Quit date:      Years since quittin.6     Passive exposure: Never    Smokeless tobacco: Never   Vaping Use    Vaping status: Never Used   Substance and Sexual Activity    Alcohol use: No    Drug use: No    Sexual activity: Yes     Partners: Male     Birth control/protection: OCP, Surgical     Problem list reviewed by Pooja Sutton, PharmD on 2024 at  1:58 PM    Hospitalizations and Urgent Care Since Last Assessment  ED Visits, Admissions, or Hospitalizations: none   Urgent Office Visits: none     Allergies  Known allergies and reactions were discussed with the patient. The patient's chart has been reviewed for allergy information and updated as necessary.   Allergies   Allergen Reactions    Sulfa Antibiotics Unknown (See  Comments)           Allergies reviewed by Pooja Sutton, PharmD on 8/6/2024 at  1:57 PM      Current Medication List  This medication list has been reviewed with the patient and evaluated for any interactions or necessary modifications/recommendations, and updated to include all prescription medications, OTC medications, and supplements the patient is currently taking.  This list reflects what is contained in the patient's profile, which has also been marked as reviewed to communicate to other providers it is the most up to date version of the patient's current medication therapy.     Current Outpatient Medications:     dexmethylphenidate XR (FOCALIN XR) 20 MG 24 hr capsule, Take 1 capsule by mouth Daily, Disp: , Rfl:     methylphenidate (RITALIN) 10 MG tablet, Take 1 tablet by mouth Daily., Disp: , Rfl:     OnabotulinumtoxinA 200 units reconstituted solution, Inject 200 Units into the appropriate muscle as directed by prescriber Every 3 (Three) Months., Disp: 1 each, Rfl: 3    ubrogepant (Ubrelvy) 100 MG tablet, Take 1 tablet by mouth As Needed (for headache). May repeat once in 2 hours.  Do not take over 2 tabs a day or 8 tabs a month, Disp: 8 tablet, Rfl: 3  No current facility-administered medications for this visit.    Medicines reviewed by Pooja Sutton, PharmD on 8/6/2024 at  1:58 PM    Drug Interactions  none     Adverse Drug Reactions  Medication tolerability: Tolerating with no to minimal ADRs          Medication plan: Continue therapy with normal follow-up  Plan for ADR Management: not required      Adherence, Self-Administration, and Current Therapy Problems  Adherence related patient's specialty therapy was discussed with the patient. The Adherence segment of this outreach has been reviewed and updated.   Adherence Questions  Linked Medication(s) Assessed: Onabotulinumtoxina  On average, how many doses/injections does the patient miss per month?: 0  What are the identified reasons for  non-adherence or missed doses? : no problems identified  What is the estimated medication adherence level?: % (since we took over)  Based on the patient/caregiver response and refill history, does this patient require an MTP to track adherence improvements?: no    Additional Barriers to Patient Self-Administration: none  Methods for Supporting Patient Self-Administration: not required    Recently Close Medication Therapy Problems  No medication therapy recommendations to display  Open Medication Therapy Problems  No medication therapy recommendations to display     Goals of Therapy  Goals related to the patient's specialty therapy was discussed with the patient. The Patient Goals segment of this outreach has been reviewed and updated.    Goals Addressed Today        Specialty Pharmacy General Goal      Decrease frequency, severity and duration of migraine attacks from baseline (~5 per month)                 Quality of Life Assessment   Quality of Life related to the patient's enrollment in the patient management program and services provided was discussed with the patient. The QOL segment of this outreach has been reviewed and updated.   Quality of Life Improvement Scale: 9-A good deal better    Reassessment Plan & Follow-Up  Medication Therapy Changes: Continue with Botox 155 units IM to be administered by Provider in office every 3 months  Related Plans, Therapy Recommendations, or Issues to Be Addressed: none at this time  Pharmacist to perform regular reassessments no more than (6) months from the previous assessment.  Care Coordinator to set up future refill outreaches, coordinate prescription delivery, and escalate clinical questions to pharmacist.     Attestation  Therapeutic appropriateness: Appropriate  I attest the patient was actively involved in and has agreed to the above plan of care. If the prescribed therapy is at any point deemed not appropriate based on the current or future assessments, a  consultation will be initiated with the patient's specialty care provider to determine the best course of action. The revised plan of therapy will be documented along with any additional patient education provided. Discussed aforementioned material with patient by phone.    Franko MckeonD, [facility]  Clinic Specialty Pharmacist, Neurology  8/6/2024  13:59 EDT

## 2024-08-22 ENCOUNTER — SPECIALTY PHARMACY (OUTPATIENT)
Dept: NEUROLOGY | Facility: CLINIC | Age: 39
End: 2024-08-22
Payer: COMMERCIAL

## 2024-09-18 ENCOUNTER — PROCEDURE VISIT (OUTPATIENT)
Dept: NEUROLOGY | Facility: CLINIC | Age: 39
End: 2024-09-18
Payer: COMMERCIAL

## 2024-09-18 DIAGNOSIS — G43.109 MIGRAINE WITH AURA AND WITHOUT STATUS MIGRAINOSUS, NOT INTRACTABLE: Primary | ICD-10-CM

## 2024-11-06 DIAGNOSIS — M25.522 ARTHRALGIA OF LEFT ELBOW: Primary | ICD-10-CM

## 2024-11-07 ENCOUNTER — OFFICE VISIT (OUTPATIENT)
Dept: ORTHOPEDIC SURGERY | Facility: CLINIC | Age: 39
End: 2024-11-07
Payer: COMMERCIAL

## 2024-11-07 VITALS
DIASTOLIC BLOOD PRESSURE: 80 MMHG | SYSTOLIC BLOOD PRESSURE: 120 MMHG | HEIGHT: 66 IN | BODY MASS INDEX: 26.58 KG/M2 | WEIGHT: 165.4 LBS

## 2024-11-07 DIAGNOSIS — M25.522 ELBOW PAIN, LEFT: Primary | ICD-10-CM

## 2024-11-07 PROCEDURE — 99204 OFFICE O/P NEW MOD 45 MIN: CPT | Performed by: PHYSICIAN ASSISTANT

## 2024-11-07 RX ORDER — SEMAGLUTIDE 0.25 MG/.5ML
INJECTION, SOLUTION SUBCUTANEOUS
COMMUNITY
Start: 2024-08-13

## 2024-11-07 NOTE — PROGRESS NOTES
Subjective   Patient ID: Hermila Sierra is a 39 y.o. right hand dominant female is being seen for orthopaedic evaluation today for left elbow   Pain of the Left Elbow (Reports driving to [place] 5 weeks ago and noticed pain after. Unable to straighten arm or lift. )         Pain  Associated symptoms include arthralgias (left elbow).     Patient presents with left forearm pain about 5 weeks ago. Unsure of any known injury or trauma. Extending the left elbow increases discomfort.   She mentions she is not able to reproduce the pain with palpation.  Denies numbness or tingling. No recent illness or infections. No warmth/redness, bruising to left elbow.    Has used a heating paid and motrin with limited improvement.                                                 Past Medical History:   Diagnosis Date    Abnormal Pap smear of cervix     Abnormal Pap smear of cervix 2020    LSIL    BRCA1 negative     BRCA2 negative     Cervical dysplasia     HPV (human papilloma virus) infection     Migraine         Past Surgical History:   Procedure Laterality Date    DILATATION AND CURETTAGE      TUBAL ABDOMINAL LIGATION      WISDOM TOOTH EXTRACTION         Family History   Problem Relation Age of Onset    Diabetes Mother     Hypertension Father     Heart disease Father     Diabetes Father     Diabetes Brother     Breast cancer Paternal Grandmother     Breast cancer Paternal Aunt     Ovarian cancer Neg Hx         Social History     Socioeconomic History    Marital status:    Tobacco Use    Smoking status: Former     Current packs/day: 0.00     Types: Cigarettes     Start date:      Quit date:      Years since quittin.8     Passive exposure: Never    Smokeless tobacco: Never   Vaping Use    Vaping status: Never Used   Substance and Sexual Activity    Alcohol use: No    Drug use: No    Sexual activity: Yes     Partners: Male     Birth control/protection: OCP, Surgical       Allergies   Allergen Reactions  "   Sulfa Antibiotics Unknown (See Comments)             Review of Systems   Musculoskeletal:  Positive for arthralgias (left elbow).       Objective   /80   Ht 167.6 cm (65.98\")   Wt 75 kg (165 lb 6.4 oz)   BMI 26.71 kg/m²   Physical Exam  Vitals and nursing note reviewed.   Constitutional:       Appearance: Normal appearance.   Pulmonary:      Effort: Pulmonary effort is normal.   Musculoskeletal:      Left elbow: No deformity. Decreased range of motion.      Left forearm: No edema, deformity or bony tenderness.      Left wrist: No swelling or deformity.      Left hand: No tenderness or bony tenderness. Normal range of motion. Normal pulse.      Comments: I am not able to reproduce any of the pain with palpation.      Neurological:      Mental Status: She is alert and oriented to person, place, and time.       Left Elbow Exam     Range of Motion   Extension:  10   Flexion:  100   Pronation:  20   Supination:  80     Tests   Varus: negative  Valgus: negative  Tinel's sign (cubital tunnel): negative    Other   Erythema: absent  Sensation: normal  Pulse: present    Comments:  No Lymphadenopathy to left AC            Neurological Exam  Mental Status  Alert. Oriented to person, place, and time.       Neg Hook sign Left elbow    Difficulty with pronosupination of LUE    Assessment & Plan   Independent Review of Radiographic Studies:    Xray of the left elbow 3 view In office for eval of pain. No comparison films available to review. No acute fx or dislocation. Radiocapitellar line intact.  Mild joint narowing        Procedures      Diagnoses and all orders for this visit:    1. Elbow pain, left (Primary)  -     Diclofenac Sodium (VOLTAREN) 1 % gel gel; Apply 4 g topically to the appropriate area as directed 3 (Three) Times a Day.  Dispense: 100 g; Refill: 1  -     MRI elbow left wo contrast; Future       Discussion of orthopedic goals  Orthopedic activities reviewed and patient expressed appreciation  Risk, " benefits, and merits of treatment alternatives reviewed with the patient and questions answered  Ice, heat, and/or modalities as beneficial  Weight bearing parameters reviewed    Recommendations/Plan:  Exercise, medications, injections, other patient advice, and return appointment as noted.  Patient is encouraged to call or return for any issues or concerns.  Continue using warm heat.     Follow up after MRI of left elbow    Patient agreeable to call or return sooner for any concerns.

## 2024-11-08 ENCOUNTER — HOSPITAL ENCOUNTER (OUTPATIENT)
Dept: MRI IMAGING | Facility: HOSPITAL | Age: 39
Discharge: HOME | End: 2024-11-08
Admitting: PHYSICIAN ASSISTANT
Payer: COMMERCIAL

## 2024-11-08 DIAGNOSIS — M25.522 ELBOW PAIN, LEFT: ICD-10-CM

## 2024-11-08 PROCEDURE — 73221 MRI JOINT UPR EXTREM W/O DYE: CPT

## 2024-11-11 ENCOUNTER — TELEPHONE (OUTPATIENT)
Dept: ORTHOPEDIC SURGERY | Facility: CLINIC | Age: 39
End: 2024-11-11

## 2024-11-11 DIAGNOSIS — S56.512A PARTIAL TEAR OF COMMON EXTENSOR TENDON OF LEFT ELBOW: ICD-10-CM

## 2024-11-11 DIAGNOSIS — M75.22 BICEPS TENDINITIS OF LEFT UPPER EXTREMITY: Primary | ICD-10-CM

## 2024-11-11 NOTE — TELEPHONE ENCOUNTER
I was able to review the MRI findings via telephone with the patient and informed her of the left distal biceps peritendinitis and partial tear of the common extensor tendon of the left elbow.  I would like for her to continue using warm heat and alternate with ice pack applications throughout the day.  Avoid lifting, pushing or pulling with the left upper extremity until cleared by orthopedics.  She may also use an over-the-counter counterforce brace which we reviewed places that this could be purchased or she could stop by our clinic to  this brace.  I would also like for her to enroll in physical therapy as this would likely remedy her issues.  She is agreeable to outpatient physical therapy and request a place in [place].  She is agreeable for [facility] hand and PT.  We will place the referral to this therapy department.

## 2024-11-18 ENCOUNTER — SPECIALTY PHARMACY (OUTPATIENT)
Dept: NEUROLOGY | Facility: CLINIC | Age: 39
End: 2024-11-18
Payer: COMMERCIAL

## 2024-11-18 NOTE — PROGRESS NOTES
Specialty Pharmacy Refill Coordination Note     Hermila is a 39 y.o. female contacted today regarding refills of Botox specialty medication(s). This refill is for the appointment scheduled on 12/13/24.    Reviewed and verified with patient:       Specialty medication(s) and dose(s) confirmed: yes    Refill Questions      Flowsheet Row Most Recent Value   Changes to allergies? No   Changes to medications? No   New conditions or infections since last clinic visit No   Unplanned office visit, urgent care, ED, or hospital admission in the last 4 weeks  No   How does patient/caregiver feel medication is working? Very good   Financial problems or insurance changes  No   Since the previous refill, were any specialty medication doses or scheduled injections missed or delayed?  No   Does this patient require a clinical escalation to a pharmacist? No            Delivery Questions      Flowsheet Row Most Recent Value   Delivery method [employer]   Delivery address verified with patient/caregiver? Yes   Delivery address Other (Comment)  [Office]   Number of medications in delivery 1   Medication(s) being filled and delivered OnabotulinumtoxinA   Doses left of specialty medications 0   Copay verified? Yes   Copay amount 0.00   Copay form of payment No copayment ($0)   Ship Date 11/18   Delivery Date 11/19   Signature Required Yes                   Follow-up: 84 day(s)     Lucía Hoffman, Pharmacy Technician  Specialty Pharmacy Technician

## 2024-12-13 ENCOUNTER — PROCEDURE VISIT (OUTPATIENT)
Dept: NEUROLOGY | Facility: CLINIC | Age: 39
End: 2024-12-13
Payer: COMMERCIAL

## 2024-12-13 VITALS — DIASTOLIC BLOOD PRESSURE: 86 MMHG | SYSTOLIC BLOOD PRESSURE: 112 MMHG

## 2024-12-13 DIAGNOSIS — G43.109 MIGRAINE WITH AURA AND WITHOUT STATUS MIGRAINOSUS, NOT INTRACTABLE: Primary | ICD-10-CM

## 2024-12-13 NOTE — PROGRESS NOTES
Botox Procedure Note-    Current headache frequency: 1_ headaches days / month .     The risks and benefits were discussed with the patient. The patient was given the opportunity to ask questions. Informed consent form was signed.     Onabotulinumtoxin A was reconstituted with 0.9% normal saline. All injections sites were prepped with alcohol swab. Approximately 5 units of botox were injected into each of the following sites  as per routine migraine botox injection PREEMPT protocol:  procerus (1 injection), frontalis (2 injections), temporalis (8 injections), occipitalis (6 injections) cervical, upper cervical paraspinals (4 injections), and trapezius (6 injections) for a total of 28 sites.  The patient tolerated the procedure well. There were no immediate complications. The patient will follow up in approximately 12 weeks for her next injection.     Total amount of onabotulinumtoxin A injected was 155 units with 45 units wasted.     Additional notes-patient states that her headaches have been controlled.  She continues to take Ubrelvy as needed.  -Botox was patient supplied.

## 2024-12-17 ENCOUNTER — PATIENT MESSAGE (OUTPATIENT)
Dept: OBSTETRICS AND GYNECOLOGY | Facility: CLINIC | Age: 39
End: 2024-12-17
Payer: COMMERCIAL

## 2024-12-18 RX ORDER — FLUCONAZOLE 150 MG/1
TABLET ORAL
Qty: 2 TABLET | Refills: 0 | Status: SHIPPED | OUTPATIENT
Start: 2024-12-18

## 2025-01-20 ENCOUNTER — SPECIALTY PHARMACY (OUTPATIENT)
Dept: GENERAL RADIOLOGY | Facility: HOSPITAL | Age: 40
End: 2025-01-20
Payer: COMMERCIAL

## 2025-01-20 NOTE — PROGRESS NOTES
Specialty Pharmacy Patient Management Program  Neurology Reassessment     Hermila Sierra is a 39 y.o. female with migraines seen by a Neurology provider and enrolled in the Neurology Patient Management program offered by [facility].  A follow-up outreach was conducted, including assessment of continued therapy appropriateness, medication adherence, and side effect incidence and management for Botox.     Changes to Insurance Coverage or Financial Support  No changes    [employer]  Botox Copay Card     Relevant Past Medical History and Comorbidities  Relevant medical history and concomitant health conditions were discussed with the patient. The patient's chart has been reviewed for relevant past medical history and comorbid health conditions and updated as necessary.   Past Medical History:   Diagnosis Date    Abnormal Pap smear of cervix 2005    Abnormal Pap smear of cervix 06/18/2020    LSIL    BRCA1 negative     BRCA2 negative     Cervical dysplasia     HPV (human papilloma virus) infection     Migraine      Social History     Socioeconomic History    Marital status:    Tobacco Use    Smoking status: Former     Current packs/day: 0.00     Types: Cigarettes     Start date: 2006     Quit date: 2010     Years since quitting: 15.0     Passive exposure: Never    Smokeless tobacco: Never   Vaping Use    Vaping status: Never Used   Substance and Sexual Activity    Alcohol use: No    Drug use: No    Sexual activity: Yes     Partners: Male     Birth control/protection: OCP, Surgical     Problem list reviewed by Lanny Abebe [facility] on 1/20/2025 at  9:48 AM    Hospitalizations and Urgent Care Since Last Assessment  ED Visits, Admissions, or Hospitalizations: None   Urgent Office Visits: None     Allergies  Known allergies and reactions were discussed with the patient. The patient's chart has been reviewed for allergy information and updated as necessary.   Allergies   Allergen Reactions     Sulfa Antibiotics Unknown (See Comments)           Allergies reviewed by Lanny Abebe RPH on 1/20/2025 at  9:48 AM      Current Medication List  This medication list has been reviewed with the patient and evaluated for any interactions or necessary modifications/recommendations, and updated to include all prescription medications, OTC medications, and supplements the patient is currently taking.  This list reflects what is contained in the patient's profile, which has also been marked as reviewed to communicate to other providers it is the most up to date version of the patient's current medication therapy.     Current Outpatient Medications:     dexmethylphenidate XR (FOCALIN XR) 20 MG 24 hr capsule, Take 1 capsule by mouth Daily, Disp: , Rfl:     Diclofenac Sodium (VOLTAREN) 1 % gel gel, Apply 4 g topically to the appropriate area as directed 3 (Three) Times a Day., Disp: 100 g, Rfl: 1    fluconazole (Diflucan) 150 MG tablet, 1 po now and repeat in 3 d., Disp: 2 tablet, Rfl: 0    methylphenidate (RITALIN) 10 MG tablet, Take 1 tablet by mouth Daily., Disp: , Rfl:     OnabotulinumtoxinA 200 units reconstituted solution, Inject 200 Units into the appropriate muscle as directed by prescriber Every 3 (Three) Months., Disp: 1 each, Rfl: 3    ubrogepant (Ubrelvy) 100 MG tablet, Take 1 tablet by mouth As Needed (for headache). May repeat once in 2 hours.  Do not take over 2 tabs a day or 8 tabs a month, Disp: 8 tablet, Rfl: 3    Wegovy 0.25 MG/0.5ML solution auto-injector, ADMINISTER 0.25 MG UNDER THE SKIN EVERY 7 DAYS, Disp: , Rfl:     Current Facility-Administered Medications:     OnabotulinumtoxinA 200 Units, 200 Units, Intramuscular, Q3 Months, Sravani Mccauley MD, 200 Units at 12/13/24 1307    Medicines reviewed by Lanny Abebe RPH on 1/20/2025 at  9:48 AM    Drug Interactions  None     Adverse Drug Reactions  Medication tolerability: Tolerating with no to minimal ADRs          Medication plan:  Continue therapy with normal follow-up  Plan for ADR Management: Not required      Adherence, Self-Administration, and Current Therapy Problems  Adherence related patient's specialty therapy was discussed with the patient. The Adherence segment of this outreach has been reviewed and updated.        Additional Barriers to Patient Self-Administration: None  Methods for Supporting Patient Self-Administration: Not required    Recently Close Medication Therapy Problems  No medication therapy recommendations to display  Open Medication Therapy Problems  No medication therapy recommendations to display     Goals of Therapy  Goals related to the patient's specialty therapy was discussed with the patient. The Patient Goals segment of this outreach has been reviewed and updated.    Goals Addressed Today        Specialty Pharmacy General Goal      Decrease frequency, severity and duration of migraine attacks from baseline (~5 per month)                 Quality of Life Assessment   Quality of Life related to the patient's enrollment in the patient management program and services provided was discussed with the patient. The QOL segment of this outreach has been reviewed and updated.   Quality of Life Improvement Scale: 9-A good deal better    Reassessment Plan & Follow-Up  Medication Therapy Changes: Continue Botox 200 units IM to be administered by Provider in office every 3 months  Related Plans, Therapy Recommendations, or Issues to Be Addressed: None  Pharmacist to perform regular reassessments no more than (6) months from the previous assessment.  Care Coordinator to set up future refill outreaches, coordinate prescription delivery, and escalate clinical questions to pharmacist.     Attestation  Therapeutic appropriateness: Appropriate  I attest the patient was actively involved in and has agreed to the above plan of care. If the prescribed therapy is at any point deemed not appropriate based on the current or future  assessments, a consultation will be initiated with the patient's specialty care provider to determine the best course of action. The revised plan of therapy will be documented along with any additional patient education provided. Discussed aforementioned material with patient via telemedicine.    Franko RidleyD, LY  Clinic Specialty Pharmacist, Neurology  1/20/2025  09:49 EST

## 2025-02-11 ENCOUNTER — SPECIALTY PHARMACY (OUTPATIENT)
Dept: GENERAL RADIOLOGY | Facility: HOSPITAL | Age: 40
End: 2025-02-11
Payer: COMMERCIAL

## 2025-02-12 ENCOUNTER — SPECIALTY PHARMACY (OUTPATIENT)
Dept: NEUROLOGY | Facility: CLINIC | Age: 40
End: 2025-02-12
Payer: COMMERCIAL

## 2025-02-12 NOTE — PROGRESS NOTES
Specialty Pharmacy Patient Management Program  Refill Outreach     Hermila was contacted today regarding refills of their medication(s).    Refill Questions      Flowsheet Row Most Recent Value   Changes to allergies? No   Changes to medications? No   New conditions or infections since last clinic visit No   Unplanned office visit, urgent care, ED, or hospital admission in the last 4 weeks  No   How does patient/caregiver feel medication is working? Very good   Financial problems or insurance changes  No   Since the previous refill, were any specialty medication doses or scheduled injections missed or delayed?  No   Does this patient require a clinical escalation to a pharmacist? No            Delivery Questions      Flowsheet Row Most Recent Value   Delivery method UPS   Delivery address verified with patient/caregiver? Yes   Delivery address Other (Comment)  [SHIP TO CLINIC]   Number of medications in delivery 1   Medication(s) being filled and delivered OnabotulinumtoxinA   Doses left of specialty medications 0   Copay verified? Yes   Copay amount $0   Copay form of payment No copayment ($0)   Ship Date 2/12/25   Delivery Date Selection 02/13/25   Signature Required Yes                 Follow-up: 84 day(s)     Lanny Abebe RPH  2/12/2025  10:09 EST

## 2025-03-11 ENCOUNTER — PROCEDURE VISIT (OUTPATIENT)
Dept: NEUROLOGY | Facility: CLINIC | Age: 40
End: 2025-03-11
Payer: COMMERCIAL

## 2025-03-11 VITALS
BODY MASS INDEX: 26.52 KG/M2 | DIASTOLIC BLOOD PRESSURE: 84 MMHG | SYSTOLIC BLOOD PRESSURE: 116 MMHG | HEIGHT: 66 IN | WEIGHT: 165 LBS | OXYGEN SATURATION: 98 % | HEART RATE: 86 BPM

## 2025-03-11 DIAGNOSIS — G43.109 MIGRAINE WITH AURA AND WITHOUT STATUS MIGRAINOSUS, NOT INTRACTABLE: Primary | ICD-10-CM

## 2025-03-11 NOTE — PROGRESS NOTES
Botox Procedure Note-    Current headache frequency: 1_ headaches days / month .     The risks and benefits were discussed with the patient. The patient was given the opportunity to ask questions. Informed consent form was signed.     Onabotulinumtoxin A was reconstituted with 0.9% normal saline. All injections sites were prepped with alcohol swab. Approximately 5 units of botox were injected into each of the following sites  as per routine migraine botox injection PREEMPT protocol:  procerus (1 injection), frontalis (2 injections), temporalis (8 injections), occipitalis (6 injections) cervical, upper cervical paraspinals (4 injections), and trapezius (6 injections) for a total of 28 sites.  The patient tolerated the procedure well. There were no immediate complications. The patient will follow up in approximately 12 weeks for her next injection.     Total amount of onabotulinumtoxin A injected was 155 units with 45 units wasted.     Additional notes-patient states that her headaches have been controlled.  She has about 1 ocular migraine a month preceded by visual auras for which she takes Ubrelvy.    -Botox was patient supplied.

## 2025-04-17 NOTE — PROGRESS NOTES
Specialty Pharmacy Refill Coordination Note     Hermila is a 39 y.o. female contacted today regarding refills of Botox to [facility]--appt is 9/18.    Reviewed and verified with patient:       Specialty medication(s) and dose(s) confirmed: yes    Refill Questions      Flowsheet Row Most Recent Value   Changes to allergies? No   Changes to medications? No   New conditions or infections since last clinic visit No   Unplanned office visit, urgent care, ED, or hospital admission in the last 4 weeks  No   How does patient/caregiver feel medication is working? Very good   Financial problems or insurance changes  No   Since the previous refill, were any specialty medication doses or scheduled injections missed or delayed?  No   Does this patient require a clinical escalation to a pharmacist? No            Delivery Questions      Flowsheet Row Most Recent Value   Delivery method [employer]   Delivery address verified with patient/caregiver? Yes   Delivery address Other (Comment)  [[id number]]   Number of medications in delivery 1   Medication(s) being filled and delivered Onabotulinumtoxina   Doses left of specialty medications Botox appt is 9/18   Copay verified? Yes   Copay amount $0   Copay form of payment No copayment ($0)   Ship Date 8/22   Delivery Date 8/23   Signature Required Yes                   Follow-up: 3 month(s)     Pooja Sutton, PharmD         
Detail Level: Zone

## 2025-05-07 ENCOUNTER — SPECIALTY PHARMACY (OUTPATIENT)
Dept: NEUROLOGY | Facility: CLINIC | Age: 40
End: 2025-05-07
Payer: COMMERCIAL

## 2025-05-07 NOTE — PROGRESS NOTES
Specialty Pharmacy Patient Management Program  Refill Outreach     Hermila was contacted today regarding refills of their medication(s). Botox appt 6/6/25    Refill Questions      Flowsheet Row Most Recent Value   Changes to allergies? No   Changes to medications? No   New conditions or infections since last clinic visit No   Unplanned office visit, urgent care, ED, or hospital admission in the last 4 weeks  No   How does patient/caregiver feel medication is working? Good   Financial problems or insurance changes  No   Since the previous refill, were any specialty medication doses or scheduled injections missed or delayed?  No   Does this patient require a clinical escalation to a pharmacist? No            Delivery Questions      Flowsheet Row Most Recent Value   Delivery method [employer]   Delivery address verified with patient/caregiver? Yes   Delivery address [facility]   Number of medications in delivery 1   Medication(s) being filled and delivered OnabotulinumtoxinA   Doses left of specialty medications 0   Copay verified? Yes   Copay amount $0   Copay form of payment No copayment ($0)   Delivery Date Selection 05/09/25   Signature Required No                 Follow-up: 28 day(s)     Elroy Graf, Pharmacy Technician  5/7/2025  14:40 EDT

## 2025-06-06 ENCOUNTER — PROCEDURE VISIT (OUTPATIENT)
Dept: NEUROLOGY | Facility: CLINIC | Age: 40
End: 2025-06-06
Payer: COMMERCIAL

## 2025-06-06 VITALS
BODY MASS INDEX: 26.52 KG/M2 | DIASTOLIC BLOOD PRESSURE: 94 MMHG | WEIGHT: 165 LBS | HEART RATE: 85 BPM | HEIGHT: 66 IN | SYSTOLIC BLOOD PRESSURE: 142 MMHG | OXYGEN SATURATION: 98 %

## 2025-06-06 DIAGNOSIS — G43.109 MIGRAINE WITH AURA AND WITHOUT STATUS MIGRAINOSUS, NOT INTRACTABLE: Primary | ICD-10-CM

## 2025-06-06 NOTE — PROGRESS NOTES
Botox Procedure Note-    Current headache frequency: 1-3_ headaches days / month .     The risks and benefits were discussed with the patient. The patient was given the opportunity to ask questions. Informed consent form was signed.     Onabotulinumtoxin A was reconstituted with 0.9% normal saline. All injections sites were prepped with alcohol swab. Approximately 5 units of botox were injected into each of the following sites  as per routine migraine botox injection PREEMPT protocol:  procerus (1 injection), frontalis (2 injections), temporalis (8 injections), occipitalis (6 injections) cervical, upper cervical paraspinals (4 injections), and trapezius (6 injections) for a total of 28 sites.  The patient tolerated the procedure well. There were no immediate complications. The patient will follow up in approximately 12 weeks for her next injection.     Total amount of onabotulinumtoxin A injected was 155 units with 45 units wasted.     Additional notes-patient states that her headaches have been controlled except in the last 10 days when she had 3 ocular headaches with visual auras for which she took Ubrelvy.  -Botox was patient supplied.

## 2025-07-07 ENCOUNTER — SPECIALTY PHARMACY (OUTPATIENT)
Dept: NEUROLOGY | Facility: CLINIC | Age: 40
End: 2025-07-07
Payer: COMMERCIAL

## 2025-07-07 NOTE — PROGRESS NOTES
Specialty Pharmacy Patient Management Program  Neurology Reassessment     Hermila Sierra is a 40 y.o. female with migraines seen by a Neurology provider and enrolled in the Neurology Patient Management program offered by [facility].  A follow-up outreach was conducted, including assessment of continued therapy appropriateness, medication adherence, and side effect incidence and management for Botox.     Changes to Insurance Coverage or Financial Support  No changes    [facility] [employer]  Botox Copay Cards    Relevant Past Medical History and Comorbidities  Relevant medical history and concomitant health conditions were discussed with the patient. The patient's chart has been reviewed for relevant past medical history and comorbid health conditions and updated as necessary.   Past Medical History:   Diagnosis Date    Abnormal Pap smear of cervix 2005    Abnormal Pap smear of cervix 06/18/2020    LSIL    BRCA1 negative     BRCA2 negative     Cervical dysplasia     HPV (human papilloma virus) infection     Migraine      Social History     Socioeconomic History    Marital status:    Tobacco Use    Smoking status: Former     Current packs/day: 0.00     Types: Cigarettes     Start date: 2006     Quit date: 2010     Years since quitting: 15.5     Passive exposure: Never    Smokeless tobacco: Never   Vaping Use    Vaping status: Never Used   Substance and Sexual Activity    Alcohol use: No    Drug use: No    Sexual activity: Yes     Partners: Male     Birth control/protection: OCP, Surgical     Problem list reviewed by Lanny Abebe [facility] on 7/7/2025 at 10:20 AM    Hospitalizations and Urgent Care Since Last Assessment  ED Visits, Admissions, or Hospitalizations: None   Urgent Office Visits: None     Allergies  Known allergies and reactions were discussed with the patient. The patient's chart has been reviewed for allergy information and updated as necessary.   Allergies   Allergen Reactions     Sulfa Antibiotics Unknown (See Comments)           Allergies reviewed by Lanny Abebe RPH on 7/7/2025 at 10:20 AM    Current Medication List  This medication list has been reviewed with the patient and evaluated for any interactions or necessary modifications/recommendations, and updated to include all prescription medications, OTC medications, and supplements the patient is currently taking.  This list reflects what is contained in the patient's profile, which has also been marked as reviewed to communicate to other providers it is the most up to date version of the patient's current medication therapy.     Current Outpatient Medications:     dexmethylphenidate XR (FOCALIN XR) 20 MG 24 hr capsule, Take 1 capsule by mouth Daily, Disp: , Rfl:     Diclofenac Sodium (VOLTAREN) 1 % gel gel, Apply 4 g topically to the appropriate area as directed 3 (Three) Times a Day., Disp: 100 g, Rfl: 1    fluconazole (Diflucan) 150 MG tablet, 1 po now and repeat in 3 d., Disp: 2 tablet, Rfl: 0    methylphenidate (RITALIN) 10 MG tablet, Take 1 tablet by mouth Daily., Disp: , Rfl:     OnabotulinumtoxinA 200 units reconstituted solution, Inject 200 Units into the appropriate muscle as directed by prescriber Every 3 (Three) Months., Disp: 1 each, Rfl: 3    ubrogepant (Ubrelvy) 100 MG tablet, Take 1 tablet by mouth As Needed (for headache). May repeat once in 2 hours.  Do not take over 2 tabs a day or 8 tabs a month, Disp: 8 tablet, Rfl: 3    Wegovy 0.25 MG/0.5ML solution auto-injector, ADMINISTER 0.25 MG UNDER THE SKIN EVERY 7 DAYS, Disp: , Rfl:     Current Facility-Administered Medications:     OnabotulinumtoxinA 155 Units, 155 Units, Intramuscular, Q12 Weeks, Sravani Mccauley MD, 155 Units at 06/06/25 1425    OnabotulinumtoxinA 200 Units, 200 Units, Intramuscular, Q3 Months, Sravani Mccauley MD, 200 Units at 12/13/24 1307    Medicines reviewed by Lanny Abebe RPH on 7/7/2025 at 10:20 AM    Drug Interactions  None      Adverse Drug Reactions  Medication tolerability: Tolerating with no to minimal ADRs          Medication plan: Continue therapy with normal follow-up  Plan for ADR Management: Not required      Adherence, Self-Administration, and Current Therapy Problems  Adherence related patient's specialty therapy was discussed with the patient. The Adherence segment of this outreach has been reviewed and updated.   Adherence Questions  Linked Medication(s) Assessed: OnabotulinumtoxinA  On average, how many doses/injections does the patient miss per month?: 0  What are the identified reasons for non-adherence or missed doses? : no problems identified  What is the estimated medication adherence level?: %  Based on the patient/caregiver response and refill history, does this patient require an MTP to track adherence improvements?: no    Additional Barriers to Patient Self-Administration: None  Methods for Supporting Patient Self-Administration: Not required    Recently Close Medication Therapy Problems  No medication therapy recommendations to display  Open Medication Therapy Problems  No medication therapy recommendations to display     Goals of Therapy  Goals related to the patient's specialty therapy was discussed with the patient. The Patient Goals segment of this outreach has been reviewed and updated.    Goals Addressed Today        Specialty Pharmacy General Goal      On Average, Reduce:   Frequency of migraines to 4 per month.   Symptom severity by 50% within 1 hour of taking acute therapy.   Duration of migraines to 2 hours.     Baseline Values/Notes on Enrollment  Frequency: 8 MMD  Symptom Severity: 8/10  Duration: Several hours up to multiple days    Date of Reassessment Notes on Progress Toward Above Goals   7/7/25 Patient reports 1-2 MMD.                                                          Quality of Life Assessment   Quality of Life related to the patient's enrollment in the patient management program  and services provided was discussed with the patient. The QOL segment of this outreach has been reviewed and updated.   Quality of Life Improvement Scale: 9-A good deal better    Reassessment Plan & Follow-Up  Medication Therapy Changes: Continue Botox 155 units IM to be administered by Provider in office every 3 months.  Related Plans, Therapy Recommendations, or Issues to Be Addressed: None  Pharmacist to perform regular reassessments no more than (6) months from the previous assessment.  Care Coordinator to set up future refill outreaches, coordinate prescription delivery, and escalate clinical questions to pharmacist.     Attestation  Therapeutic appropriateness: Appropriate  I attest the patient was actively involved in and has agreed to the above plan of care. If the prescribed therapy is at any point deemed not appropriate based on the current or future assessments, a consultation will be initiated with the patient's specialty care provider to determine the best course of action. The revised plan of therapy will be documented along with any additional patient education provided. Discussed aforementioned material with patient by phone.    Franko RidleyD, LY  Clinic Specialty Pharmacist, Neurology  7/7/2025  10:21 EDT

## 2025-08-18 ENCOUNTER — SPECIALTY PHARMACY (OUTPATIENT)
Dept: NEUROLOGY | Facility: CLINIC | Age: 40
End: 2025-08-18
Payer: COMMERCIAL